# Patient Record
Sex: FEMALE | Race: WHITE | NOT HISPANIC OR LATINO | Employment: FULL TIME | URBAN - METROPOLITAN AREA
[De-identification: names, ages, dates, MRNs, and addresses within clinical notes are randomized per-mention and may not be internally consistent; named-entity substitution may affect disease eponyms.]

---

## 2017-05-31 ENCOUNTER — OFFICE VISIT (OUTPATIENT)
Dept: UROLOGY | Facility: CLINIC | Age: 27
End: 2017-05-31
Payer: COMMERCIAL

## 2017-05-31 VITALS
BODY MASS INDEX: 21.97 KG/M2 | WEIGHT: 140 LBS | DIASTOLIC BLOOD PRESSURE: 94 MMHG | SYSTOLIC BLOOD PRESSURE: 130 MMHG | HEIGHT: 67 IN | HEART RATE: 79 BPM

## 2017-05-31 DIAGNOSIS — N30.01 ACUTE CYSTITIS WITH HEMATURIA: Primary | ICD-10-CM

## 2017-05-31 PROCEDURE — 99203 OFFICE O/P NEW LOW 30 MIN: CPT | Mod: S$GLB,,, | Performed by: UROLOGY

## 2017-05-31 PROCEDURE — 87086 URINE CULTURE/COLONY COUNT: CPT

## 2017-05-31 PROCEDURE — 99999 PR PBB SHADOW E&M-NEW PATIENT-LVL IV: CPT | Mod: PBBFAC,,, | Performed by: UROLOGY

## 2017-05-31 NOTE — PROGRESS NOTES
Subjective:       Patient ID: Louann Florian is a 27 y.o. female.    Chief Complaint: Urinary Tract Infection (she states she has been battling a uti since sept 2016. she works in [place] and was treated with antibiotics without cultures. she had a culture when she came home and was treated and went back to work. she had a 2nd culture on return and was infected again and treated with 14 days of antibiotics that was 3 weeks ago  urine dips clear today)    HPI  patient is here for recurrent UTIs over the past 10 months.  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  She's had testing but no cultures while on the boat and was treated.  She's also had positive cultures done back in her home.  No fever chills nausea vomiting.  She has fullness on the right flank area flank pain.  She's had a couple episodes of hematuria..  It does not seem that these episodes of  cystitis.  Today her urine is clear she states she is not pregnant.  History reviewed. No pertinent past medical history.    History reviewed. No pertinent surgical history.    History reviewed. No pertinent family history.    Social History     Social History    Marital status: Single     Spouse name: N/A    Number of children: N/A    Years of education: N/A     Occupational History    Not on file.     Social History Main Topics    Smoking status: Never Smoker    Smokeless tobacco: Not on file    Alcohol use Not on file    Drug use: Unknown    Sexual activity: Not on file     Other Topics Concern    Not on file     Social History Narrative    No narrative on file       Allergies:  Review of patient's allergies indicates no known allergies.    Medications:  No current outpatient prescriptions on file.    Review of Systems   Constitutional: Negative.    HENT: Negative.    Eyes: Negative.    Respiratory: Negative.    Endocrine: Negative.    Genitourinary: Negative.    Musculoskeletal: Negative.    Allergic/Immunologic:  Negative.    Neurological: Negative.    Hematological: Negative.    Psychiatric/Behavioral: Negative.        Objective:      Physical Exam   Constitutional: She appears well-developed.   HENT:   Head: Normocephalic.   Cardiovascular: Normal rate.    Pulmonary/Chest: Effort normal.   Abdominal: Soft.   Musculoskeletal: Normal range of motion.   Neurological: She is alert.   Skin: Skin is warm.         Assessment:       1. Acute cystitis with hematuria        Plan:       Louann was seen today for urinary tract infection.    Diagnoses and all orders for this visit:    Acute cystitis with hematuria  -     Urine culture  -     US Retroperitoneal Complete (Kidney and; Future  -     X-Ray Abdomen AP 1 View; Future  -     Cystoscopy; Future        e'll consider low-dose Macrodantin but I will await the results for culture Her urine is clear today but she states that she's had positive cultures with negative urinalyses in the past.  I've also recommended probiotics yogurt cranberry juice and drinking lots of waterand she states that cranberry juice has helped her in the past

## 2017-06-01 ENCOUNTER — TELEPHONE (OUTPATIENT)
Dept: UROLOGY | Facility: CLINIC | Age: 27
End: 2017-06-01

## 2017-06-01 LAB
BACTERIA UR CULT: NORMAL
BACTERIA UR CULT: NORMAL

## 2017-06-01 NOTE — TELEPHONE ENCOUNTER
----- Message from Reilly Martinez Jr., MD sent at 6/1/2017  4:12 PM CDT -----  Neg culture  Does she want to start low dose macrodantin?

## 2017-06-02 RX ORDER — NITROFURANTOIN MACROCRYSTALS 50 MG/1
50 CAPSULE ORAL EVERY 6 HOURS
Qty: 30 CAPSULE | Refills: 6 | Status: SHIPPED | OUTPATIENT
Start: 2017-06-02 | End: 2022-02-16

## 2017-06-13 ENCOUNTER — TELEPHONE (OUTPATIENT)
Dept: UROLOGY | Facility: CLINIC | Age: 27
End: 2017-06-13

## 2017-06-13 ENCOUNTER — HOSPITAL ENCOUNTER (OUTPATIENT)
Dept: RADIOLOGY | Facility: HOSPITAL | Age: 27
Discharge: HOME | End: 2017-06-13
Attending: UROLOGY
Payer: COMMERCIAL

## 2017-06-13 DIAGNOSIS — N30.01 ACUTE CYSTITIS WITH HEMATURIA: ICD-10-CM

## 2017-06-13 PROCEDURE — 76770 US EXAM ABDO BACK WALL COMP: CPT | Mod: TC

## 2017-06-13 PROCEDURE — 76770 US EXAM ABDO BACK WALL COMP: CPT | Mod: 26,,, | Performed by: RADIOLOGY

## 2017-06-13 NOTE — TELEPHONE ENCOUNTER
----- Message from Reilly Martinez Jr., MD sent at 6/13/2017  5:02 PM CDT -----  5 mm stone r kidney  Needs kub

## 2017-06-15 ENCOUNTER — TELEPHONE (OUTPATIENT)
Dept: UROLOGY | Facility: CLINIC | Age: 27
End: 2017-06-15

## 2017-06-15 ENCOUNTER — HOSPITAL ENCOUNTER (OUTPATIENT)
Dept: RADIOLOGY | Facility: HOSPITAL | Age: 27
Discharge: HOME | End: 2017-06-15
Attending: UROLOGY
Payer: COMMERCIAL

## 2017-06-15 DIAGNOSIS — N30.01 ACUTE CYSTITIS WITH HEMATURIA: ICD-10-CM

## 2017-06-15 DIAGNOSIS — N20.0 KIDNEY STONES: Primary | ICD-10-CM

## 2017-06-15 PROCEDURE — 74000 XR ABDOMEN AP 1 VIEW: CPT | Mod: 26,,, | Performed by: RADIOLOGY

## 2017-06-15 PROCEDURE — 74000 XR ABDOMEN AP 1 VIEW: CPT | Mod: TC

## 2017-06-15 NOTE — TELEPHONE ENCOUNTER
----- Message from Reilly Martinez Jr., MD sent at 6/15/2017 10:18 AM CDT -----  Us shows r renal stone  KUB no stone but overlying bowel gas  Can do ct if she wishes or repeat kub in 6 mos

## 2017-06-15 NOTE — TELEPHONE ENCOUNTER
----- Message from Cora Griffith MA sent at 6/15/2017  9:33 AM CDT -----  Contact: [phone]  Asking if her results from her xray are back, states she needs to know ASAP if anything needs to be done because she leaves for [facility] Tuesday.

## 2017-06-16 ENCOUNTER — HOSPITAL ENCOUNTER (OUTPATIENT)
Dept: RADIOLOGY | Facility: OTHER | Age: 27
Discharge: HOME | End: 2017-06-16
Attending: UROLOGY
Payer: COMMERCIAL

## 2017-06-16 DIAGNOSIS — N20.0 KIDNEY STONES: ICD-10-CM

## 2017-06-16 PROCEDURE — 74176 CT ABD & PELVIS W/O CONTRAST: CPT | Mod: 26,,, | Performed by: RADIOLOGY

## 2017-06-16 PROCEDURE — 74176 CT ABD & PELVIS W/O CONTRAST: CPT | Mod: TC

## 2017-06-19 DIAGNOSIS — N20.0 RENAL CALCULI: Primary | ICD-10-CM

## 2017-07-04 ENCOUNTER — PATIENT MESSAGE (OUTPATIENT)
Dept: UROLOGY | Facility: CLINIC | Age: 27
End: 2017-07-04

## 2017-10-30 ENCOUNTER — TELEPHONE (OUTPATIENT)
Dept: OBSTETRICS AND GYNECOLOGY | Facility: CLINIC | Age: 27
End: 2017-10-30

## 2017-10-30 ENCOUNTER — PATIENT MESSAGE (OUTPATIENT)
Dept: OBSTETRICS AND GYNECOLOGY | Facility: CLINIC | Age: 27
End: 2017-10-30

## 2017-10-30 DIAGNOSIS — N91.2 AMENORRHEA: Primary | ICD-10-CM

## 2017-10-30 NOTE — TELEPHONE ENCOUNTER
----- Message from Larisa Bright sent at 10/30/2017  8:06 AM CDT -----  Contact: self  Patient would like to schedule initial ob appointment. Patient actually works in [place] and is gone for a month at a time. Patient will be returning again on 11/10-12/1. Patient would like to schedule a dating u/s and initial ob appoinment at this time. Patient cannot receive international phone calls so patient would like to be contacted through [facility] if possible. Patient's LMP was on 10/1/17.

## 2017-11-14 ENCOUNTER — LAB VISIT (OUTPATIENT)
Dept: LAB | Facility: OTHER | Age: 27
End: 2017-11-14
Attending: OBSTETRICS & GYNECOLOGY
Payer: COMMERCIAL

## 2017-11-14 ENCOUNTER — OFFICE VISIT (OUTPATIENT)
Dept: OBSTETRICS AND GYNECOLOGY | Facility: CLINIC | Age: 27
End: 2017-11-14
Payer: COMMERCIAL

## 2017-11-14 VITALS
WEIGHT: 142.19 LBS | SYSTOLIC BLOOD PRESSURE: 116 MMHG | BODY MASS INDEX: 22.32 KG/M2 | HEIGHT: 67 IN | DIASTOLIC BLOOD PRESSURE: 84 MMHG

## 2017-11-14 DIAGNOSIS — O21.9 NAUSEA/VOMITING IN PREGNANCY: ICD-10-CM

## 2017-11-14 DIAGNOSIS — N91.2 AMENORRHEA: ICD-10-CM

## 2017-11-14 DIAGNOSIS — N91.2 AMENORRHEA: Primary | ICD-10-CM

## 2017-11-14 LAB
ABO + RH BLD: NORMAL
B-HCG UR QL: POSITIVE
BASOPHILS # BLD AUTO: 0.02 K/UL
BASOPHILS NFR BLD: 0.2 %
BLD GP AB SCN CELLS X3 SERPL QL: NORMAL
CTP QC/QA: YES
DIFFERENTIAL METHOD: NORMAL
EOSINOPHIL # BLD AUTO: 0.2 K/UL
EOSINOPHIL NFR BLD: 1.8 %
ERYTHROCYTE [DISTWIDTH] IN BLOOD BY AUTOMATED COUNT: 13.6 %
HCT VFR BLD AUTO: 39.5 %
HGB BLD-MCNC: 13.1 G/DL
LYMPHOCYTES # BLD AUTO: 3.3 K/UL
LYMPHOCYTES NFR BLD: 28.1 %
MCH RBC QN AUTO: 28.6 PG
MCHC RBC AUTO-ENTMCNC: 33.2 G/DL
MCV RBC AUTO: 86 FL
MONOCYTES # BLD AUTO: 1 K/UL
MONOCYTES NFR BLD: 8.1 %
NEUTROPHILS # BLD AUTO: 7.2 K/UL
NEUTROPHILS NFR BLD: 61.6 %
PLATELET # BLD AUTO: 240 K/UL
PMV BLD AUTO: 10.5 FL
RBC # BLD AUTO: 4.58 M/UL
WBC # BLD AUTO: 11.69 K/UL

## 2017-11-14 PROCEDURE — 99999 PR PBB SHADOW E&M-EST. PATIENT-LVL II: CPT | Mod: PBBFAC,,, | Performed by: OBSTETRICS & GYNECOLOGY

## 2017-11-14 PROCEDURE — 85025 COMPLETE CBC W/AUTO DIFF WBC: CPT

## 2017-11-14 PROCEDURE — 36415 COLL VENOUS BLD VENIPUNCTURE: CPT

## 2017-11-14 PROCEDURE — 86762 RUBELLA ANTIBODY: CPT

## 2017-11-14 PROCEDURE — 81025 URINE PREGNANCY TEST: CPT | Mod: S$GLB,,, | Performed by: OBSTETRICS & GYNECOLOGY

## 2017-11-14 PROCEDURE — 99214 OFFICE O/P EST MOD 30 MIN: CPT | Mod: 25,S$GLB,, | Performed by: OBSTETRICS & GYNECOLOGY

## 2017-11-14 PROCEDURE — 86703 HIV-1/HIV-2 1 RESULT ANTBDY: CPT

## 2017-11-14 PROCEDURE — 87340 HEPATITIS B SURFACE AG IA: CPT

## 2017-11-14 PROCEDURE — 86901 BLOOD TYPING SEROLOGIC RH(D): CPT

## 2017-11-14 PROCEDURE — 87086 URINE CULTURE/COLONY COUNT: CPT

## 2017-11-14 PROCEDURE — 86592 SYPHILIS TEST NON-TREP QUAL: CPT

## 2017-11-14 PROCEDURE — 86900 BLOOD TYPING SEROLOGIC ABO: CPT

## 2017-11-14 PROCEDURE — 87591 N.GONORRHOEAE DNA AMP PROB: CPT

## 2017-11-14 PROCEDURE — 81220 CFTR GENE COM VARIANTS: CPT

## 2017-11-14 RX ORDER — DOXYLAMINE SUCCINATE AND PYRIDOXINE HYDROCHLORIDE, DELAYED RELEASE TABLETS 10 MG/10 MG 10; 10 MG/1; MG/1
2 TABLET, DELAYED RELEASE ORAL NIGHTLY
Qty: 120 TABLET | Refills: 1 | Status: SHIPPED | OUTPATIENT
Start: 2017-11-14 | End: 2022-02-16

## 2017-11-14 NOTE — PROGRESS NOTES
Louann Florina is a 27 y.o. , presents today for amenorrhea.    Has not seen any other provider for this possible pregnancy.     HPI: Patient's last menstrual period was 10/01/2017. This was the exact date. Prior to LMP, menses were regular occuring every 28 days. She is not currently on any contraception. Denies Experiencing some mild nausea, no vomiting. Has noticed breast tenderness. Denies vaginal bleeding since LMP.    SOCIAL HISTORY: Denies emotional/mental/physical/sexual violence or abuse. Feels safe at home. Accompanied today by her  who is the father of the baby. Feeling excited about pregnancy. This is the first baby for both of them. 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     PAP HISTORY: Pap up to date 2016 no history of abnormal paps in the past.     Reports a history of non-obstructive nephrolithiasis. She had recurrent UTIs at the end of 2016. She was seen by Urology in May 2017, was having some right flank pain and hematuria. She is unsure if the kidney stones ever passed.    Review of patient's allergies indicates:  No Known Allergies  No past medical history on file.  No past surgical history on file.  No past surgical history on file.  OB History    Para Term  AB Living   1             SAB TAB Ectopic Multiple Live Births                  # Outcome Date GA Lbr Blue/2nd Weight Sex Delivery Anes PTL Lv   1 Current                 OB History      Para Term  AB Living    1              SAB TAB Ectopic Multiple Live Births                     Social History     Social History    Marital status: Single     Spouse name: N/A    Number of children: N/A    Years of education: N/A     Occupational History    Not on file.     Social History Main Topics    Smoking status: Never Smoker    Smokeless tobacco: Never Used    Alcohol use No    Drug use: No    Sexual activity: Yes     Partners: Male     Other Topics Concern    Not  on file     Social History Narrative    No narrative on file     Family History   Problem Relation Age of Onset    Breast cancer Neg Hx     Colon cancer Neg Hx     Diabetes Neg Hx     Hypertension Neg Hx     Eclampsia Neg Hx     Stroke Neg Hx     Miscarriages / Stillbirths Neg Hx     Ovarian cancer Neg Hx      labor Neg Hx      History   Sexual Activity    Sexual activity: Yes    Partners: Male       GENETIC SCREENING   Patient's age 35 years or older as of estimated date of delivery? no  Nural tube defect (meningomyelocele, spina bifida, or anencephaly)? no  Down syndrome? no  Martín-Sachs (Ashkenazi Hindu, Cajun, Hungarian [place])? no  Canavan disease (Ashkenazi Hindu)? no  Familial dysautonomia (Ashkenazi Hindu)? no  Sickle cell disease or trait ()? no  Hemophilia or other blood disorders? no  Cystic fibrosis? no  Muscular dystrophy? no  Eakly's chorea? no  Thalassemia (Italian, Greek, Mediterranean, or  background) MCV less than 80? no  Congenital heart defect? no  Mental retardation/autism? no   If Yes, was person tested for Fragile X? no  Other inherited genetic or chromosomal disorder? no  Maternal metabolic disorder (e.g. type 1 diabetes, PKU)? no  Patient or baby's father had a child with birth defects not listed above? no  Recurrent pregnancy loss or a stillbirth: no  Medications (including supplements, vitamins, herbs or OTC drugs)/illicit/recreational drugs/alcohol since last menstrual period? no   If yes, agent(s) and strength/dose: no  List any other genetic risks: no  Comments/counseling: no    INFECTION HISTORY  Live with someone with TB or exposed to TB: no  Patient or partner has history of genital herpes: no  Rash or viral illness since last menstrual period: no  Patient or partner has hepatitis B or C: no  History of STD, gonorrhea, chlamydia, HPV, HIV, syphilis (list all that apply): no  List other infections: no  Additional comments: no    Primary Doctor No  "    ROS:    Constitutional/Gen: Denies fevers, chills, malaise, or weight loss. Pos fatigue   Psych: Denies depression, anxiety  Eyes: Denies changes in vision or scotomata  Ears, nose, mouth, throat: Denies sinus tenderness, swelling, or dentition problems  CV/vasc: Denies heart palpitations or edema  Resp: Denies SOB or dyspnea  Breasts: Denies mass, nipple discharge, or trauma. Pos breast tenderness.  GI: Denies constipation, diarrhea, or vomiting. mild nausea.  : Denies vaginal discharge, dysuria or pelvic pain. Neg urinary frequency.  MS: Denies weakness, soreness, or changes in ROM    OBJECTIVE:  /84   Ht 5' 7" (1.702 m)   Wt 64.5 kg (142 lb 3.2 oz)   LMP 10/01/2017   BMI 22.27 kg/m²   Constitutional/Gen: NAD, appears stated age, well groomed  Neck: supple, no masses or enlargement  Head: normocephalic  Skin: warm and dry w/o rash  Lung: normal resp effort, CTAB  Heart: normal HR, RRR   Back: negative CVAT  Breasts: bilaterally--no masses, tenderness, skin changes, or nipple discharge noted  Abdomen: soft, nontender, no masses, and bowel sounds normal, no enlargement  External genitalia: no lesions or discharge, normal hair distribution  Urethral meatus: normal size and location, no lesions or prolapse  Vagina: normal appearance, no lesions, no discharge, no evidence cystocele or rectocele.  Cervix: normal appearance, no discharge, no lesions, negative CMT  Uterus: nontender, mobile, approx 6 week size, contour, and position.  Adnexa: no masses or tenderness  Anus/Perineum: normal appearance, with no lesions or discharge. Internal exam deferred.  Extremities: FROM, with no edema or tenderness.  Neurologic: A&O x 4, non-focal, cranial nerves 2-12 grossly intact  Psych: affect appropriate and without signs of mood, thought or memory difficulty appreciated    UPT pos in office    ASSESSMENT:  27 y.o. female  with amenorrhea  Likely at 6w2d via LMP; S=D  Body mass index is 22.27 kg/m².  There is no " problem list on file for this patient.    PLAN:  1. Amenorrhea  -- + UPT in office, Patient's last menstrual period was 10/01/2017. --> Estimated Date of Delivery: 7/8/18  -- Dating US done  -- Anatomical US 19 - 20 weeks  -- Routine serum and urine prenatal labs today    2. Physical exam today  -- Pap up to date 11/2016    3. BMI normal  -- Discussed IOM recommended weight gain of:   Normal Weight 18.5-24.9  25-35     4. Discussed nausea and vomiting in pregnancy  -- Education regarding lifestyle and dietary modifications  -- Reviewed use of B6/Unisom prn. Pt will notify us if no relief/worsening symptoms, will consider alternative therapies prn    5. Pregnancy education and couseling; handouts and booklet provided  -- Oriented to practice and anticipated prenatal course of care and how to contact us  -- Precautions/warning signs reviewed  -- Common complaints of pregnancy  -- Routine prenatal labs including HIV  -- Ultrasounds  -- Nutrition, prepregnant BMI, and recommended weight gain  -- Toxoplasmosis precautions (Cats/Raw Meat)  -- Sexual activity and exercise  -- Environmental/Work hazards  -- Travel  -- Tobacco (Ask, Advise, Assess, Assist, and Arrange), as well as alcohol and drug use  -- Use of any medications (Including supplements, Vitamins, Herbs, or OTC Drugs)  -- Domestic violence screen neg    6. Reviewed genetic testing options. Reviewed available first trimester and/or second  trimester screening options. Reviewed risk of false positive/negative results and recommendation of referral to [facility] in event of a positive result, for NIPT, US, and/or amniocentesis. Reviewed the possible estimated 1 in 300/500 risk of miscarriage with amniocentesis, even with a healthy fetus. Patient desires genetic screening. Will order NT after dating.     8. Frequent air travel  -- Recommended to walk every 1-2 hours during flights and compression stockings.  -- Discontinue air travel at 36 wga    9. Heartburn  -- Continue  Tums, pepcid OTC    10. History of non-obstructing kidney stones  -- On macrobid suppression.  -- History as above, continue to monitor.   -- Tylenol/narcotics discussed if symptoms recur.    RTC x 4 weeks, call or present sooner prn. Patient given information to sign up for [facility].    Updated Medication List:  Current Outpatient Prescriptions   Medication Sig Dispense Refill    PRENATAL 25/IRON/FOLATE 6/DHA (PRENA1 ORAL) Take by mouth.      nitrofurantoin (MACRODANTIN) 50 MG capsule Take 1 capsule (50 mg total) by mouth every 6 (six) hours. 30 capsule 6     No current facility-administered medications for this visit.      Tyesha Parham MD  11/14/2017

## 2017-11-15 LAB
HBV SURFACE AG SERPL QL IA: NEGATIVE
HIV 1+2 AB+HIV1 P24 AG SERPL QL IA: NEGATIVE
RPR SER QL: NORMAL

## 2017-11-16 LAB
BACTERIA UR CULT: NORMAL
BACTERIA UR CULT: NORMAL
C TRACH DNA SPEC QL NAA+PROBE: NOT DETECTED
N GONORRHOEA DNA SPEC QL NAA+PROBE: NOT DETECTED

## 2017-11-17 LAB — CFTR MUT ANL BLD/T: NORMAL

## 2017-11-22 LAB — MAYO MISCELLANEOUS RESULT (REF): NORMAL

## 2017-11-24 LAB
RUBV IGG SER-ACNC: NORMAL IU/ML
RUBV IGG SER-IMP: NORMAL

## 2017-11-28 ENCOUNTER — PROCEDURE VISIT (OUTPATIENT)
Dept: OBSTETRICS AND GYNECOLOGY | Facility: CLINIC | Age: 27
End: 2017-11-28
Payer: COMMERCIAL

## 2017-11-28 DIAGNOSIS — N91.2 AMENORRHEA: ICD-10-CM

## 2017-11-28 DIAGNOSIS — Z36.89 ESTABLISH GESTATIONAL AGE, ULTRASOUND: ICD-10-CM

## 2017-11-28 PROCEDURE — 76817 TRANSVAGINAL US OBSTETRIC: CPT | Mod: S$GLB,,, | Performed by: OBSTETRICS & GYNECOLOGY

## 2017-11-29 ENCOUNTER — PATIENT MESSAGE (OUTPATIENT)
Dept: OBSTETRICS AND GYNECOLOGY | Facility: CLINIC | Age: 27
End: 2017-11-29

## 2017-11-29 DIAGNOSIS — Z13.79 ENCOUNTER FOR GENETIC SCREENING FOR DOWN SYNDROME: Primary | ICD-10-CM

## 2017-12-01 ENCOUNTER — ROUTINE PRENATAL (OUTPATIENT)
Dept: OBSTETRICS AND GYNECOLOGY | Facility: CLINIC | Age: 27
End: 2017-12-01
Payer: COMMERCIAL

## 2017-12-01 VITALS — BODY MASS INDEX: 23 KG/M2 | DIASTOLIC BLOOD PRESSURE: 80 MMHG | SYSTOLIC BLOOD PRESSURE: 106 MMHG | WEIGHT: 146.81 LBS

## 2017-12-01 DIAGNOSIS — Z34.01 ENCOUNTER FOR SUPERVISION OF NORMAL FIRST PREGNANCY IN FIRST TRIMESTER: ICD-10-CM

## 2017-12-01 DIAGNOSIS — Z3A.08 8 WEEKS GESTATION OF PREGNANCY: Primary | ICD-10-CM

## 2017-12-01 PROCEDURE — 0500F INITIAL PRENATAL CARE VISIT: CPT | Mod: S$GLB,,, | Performed by: OBSTETRICS & GYNECOLOGY

## 2017-12-01 PROCEDURE — 99999 PR PBB SHADOW E&M-EST. PATIENT-LVL II: CPT | Mod: PBBFAC,,, | Performed by: OBSTETRICS & GYNECOLOGY

## 2017-12-01 NOTE — PROGRESS NOTES
Feeling crampy today, unsure if it is gas pain. Recommended hydration, tylenol. Patient with questions about continuing work (works in [place] for a month at a time). 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 Reassured patient that continuing work is safe for now. Discussed most common complication in first trimester is miscarriage and most life threatening complication would be hemorrhage.   Will be out of the country for 1st trimester screening, will do quad screen when she returns.   Declined flu shot.   F/U 4 weeks.

## 2017-12-08 ENCOUNTER — PATIENT MESSAGE (OUTPATIENT)
Dept: MATERNAL FETAL MEDICINE | Facility: CLINIC | Age: 27
End: 2017-12-08

## 2017-12-26 ENCOUNTER — PATIENT MESSAGE (OUTPATIENT)
Dept: MATERNAL FETAL MEDICINE | Facility: CLINIC | Age: 27
End: 2017-12-26

## 2018-01-08 ENCOUNTER — LAB VISIT (OUTPATIENT)
Dept: LAB | Facility: OTHER | Age: 28
End: 2018-01-08
Attending: OBSTETRICS & GYNECOLOGY
Payer: COMMERCIAL

## 2018-01-08 ENCOUNTER — ROUTINE PRENATAL (OUTPATIENT)
Dept: OBSTETRICS AND GYNECOLOGY | Facility: CLINIC | Age: 28
End: 2018-01-08
Payer: COMMERCIAL

## 2018-01-08 VITALS — BODY MASS INDEX: 23.2 KG/M2 | SYSTOLIC BLOOD PRESSURE: 120 MMHG | DIASTOLIC BLOOD PRESSURE: 78 MMHG | WEIGHT: 148.13 LBS

## 2018-01-08 DIAGNOSIS — Z13.79 ENCOUNTER FOR GENETIC SCREENING FOR DOWN SYNDROME: ICD-10-CM

## 2018-01-08 DIAGNOSIS — K59.00 CONSTIPATION DURING PREGNANCY IN SECOND TRIMESTER: ICD-10-CM

## 2018-01-08 DIAGNOSIS — O99.612 CONSTIPATION DURING PREGNANCY IN SECOND TRIMESTER: ICD-10-CM

## 2018-01-08 DIAGNOSIS — Z3A.14 14 WEEKS GESTATION OF PREGNANCY: Primary | ICD-10-CM

## 2018-01-08 PROCEDURE — 36415 COLL VENOUS BLD VENIPUNCTURE: CPT

## 2018-01-08 PROCEDURE — 0502F SUBSEQUENT PRENATAL CARE: CPT | Mod: S$GLB,,, | Performed by: OBSTETRICS & GYNECOLOGY

## 2018-01-08 PROCEDURE — 81511 FTL CGEN ABNOR FOUR ANAL: CPT

## 2018-01-08 PROCEDURE — 99999 PR PBB SHADOW E&M-EST. PATIENT-LVL II: CPT | Mod: PBBFAC,,, | Performed by: OBSTETRICS & GYNECOLOGY

## 2018-01-08 NOTE — PATIENT INSTRUCTIONS
Constipation:  Start daily fiber.  Take 1 tsp of fiber powder (psyllium or other sugar-free powder).  Mix in 8 oz of water.  Take x 3-5 days.  Then, increase fiber by 1 tsp every 3-5 days until stool is easy to pass.  Stop and continue at that dose.   Do not exceed 6 tsps/day.  May also use over the counter stool softener 1-2 x/day.  AVOID laxatives.

## 2018-01-08 NOTE — PROGRESS NOTES
Recently returned from [place]. Leaving again on 1/30/18 for another month, and then planning to stay home for the remainder of pregnancy. Having constipation/diarrhea. Recommend stool softener/fiber. Quad today. Anatomy scheduled (early at 17 weeks 2/2 travel). Discussed OB glucose screen. F/U 4 weeks.

## 2018-01-11 ENCOUNTER — TELEPHONE (OUTPATIENT)
Dept: OBSTETRICS AND GYNECOLOGY | Facility: CLINIC | Age: 28
End: 2018-01-11

## 2018-01-11 DIAGNOSIS — Z13.79 ENCOUNTER FOR GENETIC SCREENING FOR DOWN SYNDROME: Primary | ICD-10-CM

## 2018-01-11 LAB
# FETUSES US: ABNORMAL
2ND TRIMESTER 4 SCREEN PNL SERPL: ABNORMAL
2ND TRIMESTER 4 SCREEN SERPL-IMP: ABNORMAL
AFP MOM SERPL: ABNORMAL
AFP SERPL-MCNC: 20 NG/ML
AGE AT DELIVERY: 30
B-HCG MOM SERPL: ABNORMAL
B-HCG SERPL-ACNC: 36.1 IU/ML
FET TS 21 RISK FROM MAT AGE: ABNORMAL
GA (DAYS): 6 D
GA (WEEKS): 14 WK
GA METHOD: ABNORMAL
IDDM PATIENT QL: ABNORMAL
INHIBIN A MOM SERPL: ABNORMAL
INHIBIN A SERPL-MCNC: 143.9 PG/ML
SMOKING STATUS FTND: NO
TS 18 RISK FETUS: ABNORMAL
TS 21 RISK FETUS: ABNORMAL
U ESTRIOL MOM SERPL: ABNORMAL
U ESTRIOL SERPL-MCNC: 0.77 NG/ML

## 2018-01-29 ENCOUNTER — OFFICE VISIT (OUTPATIENT)
Dept: MATERNAL FETAL MEDICINE | Facility: CLINIC | Age: 28
End: 2018-01-29
Attending: OBSTETRICS & GYNECOLOGY
Payer: COMMERCIAL

## 2018-01-29 ENCOUNTER — ROUTINE PRENATAL (OUTPATIENT)
Dept: OBSTETRICS AND GYNECOLOGY | Facility: CLINIC | Age: 28
End: 2018-01-29
Payer: COMMERCIAL

## 2018-01-29 ENCOUNTER — LAB VISIT (OUTPATIENT)
Dept: LAB | Facility: OTHER | Age: 28
End: 2018-01-29
Attending: OBSTETRICS & GYNECOLOGY
Payer: COMMERCIAL

## 2018-01-29 VITALS — SYSTOLIC BLOOD PRESSURE: 120 MMHG | WEIGHT: 152 LBS | DIASTOLIC BLOOD PRESSURE: 78 MMHG | BODY MASS INDEX: 23.81 KG/M2

## 2018-01-29 DIAGNOSIS — Z36.3 ANTENATAL SCREENING FOR MALFORMATION USING ULTRASONICS: ICD-10-CM

## 2018-01-29 DIAGNOSIS — Z13.79 ENCOUNTER FOR GENETIC SCREENING FOR DOWN SYNDROME: ICD-10-CM

## 2018-01-29 DIAGNOSIS — Z3A.17 17 WEEKS GESTATION OF PREGNANCY: Primary | ICD-10-CM

## 2018-01-29 DIAGNOSIS — Z3A.17 17 WEEKS GESTATION OF PREGNANCY: ICD-10-CM

## 2018-01-29 DIAGNOSIS — Z36.89 ENCOUNTER FOR ULTRASOUND TO CHECK FETAL GROWTH: Primary | ICD-10-CM

## 2018-01-29 PROCEDURE — 76816 OB US FOLLOW-UP PER FETUS: CPT | Mod: S$GLB,,, | Performed by: OBSTETRICS & GYNECOLOGY

## 2018-01-29 PROCEDURE — 99499 UNLISTED E&M SERVICE: CPT | Mod: S$GLB,,, | Performed by: OBSTETRICS & GYNECOLOGY

## 2018-01-29 PROCEDURE — 0502F SUBSEQUENT PRENATAL CARE: CPT | Mod: S$GLB,,, | Performed by: OBSTETRICS & GYNECOLOGY

## 2018-01-29 PROCEDURE — 36415 COLL VENOUS BLD VENIPUNCTURE: CPT

## 2018-01-29 PROCEDURE — 99999 PR PBB SHADOW E&M-EST. PATIENT-LVL II: CPT | Mod: PBBFAC,,, | Performed by: OBSTETRICS & GYNECOLOGY

## 2018-01-29 PROCEDURE — 81511 FTL CGEN ABNOR FOUR ANAL: CPT

## 2018-01-29 NOTE — LETTER
January 30, 2018      Tyesha Parham MD  [address] [facility] [address]           [facility]  [address]  [facility] [address]  Phone: [phone]          Patient: Louann Florian   MR Number: [id number]   YOB: 1990   Date of Visit: 1/29/2018       Dear Dr. Tyesha Parham:    Thank you for referring Louann Florian to me for evaluation. Attached you will find relevant portions of my assessment and plan of care.    If you have questions, please do not hesitate to call me. I look forward to following Louann Florian along with you.    Sincerely,    Angie Jacques MD    Enclosure  CC:  No Recipients    If you would like to receive this communication electronically, please contact [email or web address] or [phone] to request more information on [employer] Link access.    For providers and/or their staff who would like to refer a patient to [facility], please contact us through our one-stop-shop provider referral line, [facility], at [phone].    If you feel you have received this communication in error or would no longer like to receive these types of communications, please e-mail [email or web address]

## 2018-01-29 NOTE — PROGRESS NOTES
Feeling well. Quad today. Anatomy U/S today, scheduled for follow up in 4-6 weeks. Leaving for work tomorrow for 4 weeks, plans to be last work trip during pregnancy. Letter printed for work - will need to be in the states after 28 weeks for frequent appt. F/U 5 weeks.

## 2018-01-31 LAB
# FETUSES US: NORMAL
2ND TRIMESTER 4 SCREEN PNL SERPL: NEGATIVE
2ND TRIMESTER 4 SCREEN SERPL-IMP: NORMAL
AFP MOM SERPL: 0.98
AFP SERPL-MCNC: 43 NG/ML
AGE AT DELIVERY: 30
B-HCG MOM SERPL: 0.91
B-HCG SERPL-ACNC: 22 IU/ML
FET TS 21 RISK FROM MAT AGE: NORMAL
GA (DAYS): 6 D
GA (WEEKS): 17 WK
GA METHOD: NORMAL
IDDM PATIENT QL: NORMAL
INHIBIN A MOM SERPL: 0.86
INHIBIN A SERPL-MCNC: 133.4 PG/ML
SMOKING STATUS FTND: NO
TS 18 RISK FETUS: NORMAL
TS 21 RISK FETUS: NORMAL
U ESTRIOL MOM SERPL: 2.1
U ESTRIOL SERPL-MCNC: 2.56 NG/ML

## 2018-02-05 ENCOUNTER — PATIENT MESSAGE (OUTPATIENT)
Dept: MATERNAL FETAL MEDICINE | Facility: CLINIC | Age: 28
End: 2018-02-05

## 2018-03-08 ENCOUNTER — OFFICE VISIT (OUTPATIENT)
Dept: MATERNAL FETAL MEDICINE | Facility: CLINIC | Age: 28
End: 2018-03-08
Attending: OBSTETRICS & GYNECOLOGY
Payer: COMMERCIAL

## 2018-03-08 ENCOUNTER — ROUTINE PRENATAL (OUTPATIENT)
Dept: OBSTETRICS AND GYNECOLOGY | Facility: CLINIC | Age: 28
End: 2018-03-08
Payer: COMMERCIAL

## 2018-03-08 VITALS
SYSTOLIC BLOOD PRESSURE: 110 MMHG | DIASTOLIC BLOOD PRESSURE: 70 MMHG | WEIGHT: 159.38 LBS | BODY MASS INDEX: 24.96 KG/M2

## 2018-03-08 DIAGNOSIS — Z33.1 PREGNANCY, NORMAL, INCIDENTAL: ICD-10-CM

## 2018-03-08 DIAGNOSIS — Z34.92 SECOND TRIMESTER PREGNANCY: ICD-10-CM

## 2018-03-08 DIAGNOSIS — Z3A.22 22 WEEKS GESTATION OF PREGNANCY: Primary | ICD-10-CM

## 2018-03-08 DIAGNOSIS — Z36.89 ENCOUNTER FOR ULTRASOUND TO CHECK FETAL GROWTH: ICD-10-CM

## 2018-03-08 DIAGNOSIS — Z36.3 ENCOUNTER FOR ANTENATAL SCREENING FOR MALFORMATION USING ULTRASOUND: ICD-10-CM

## 2018-03-08 PROCEDURE — 0502F SUBSEQUENT PRENATAL CARE: CPT | Mod: S$GLB,,, | Performed by: OBSTETRICS & GYNECOLOGY

## 2018-03-08 PROCEDURE — 99499 UNLISTED E&M SERVICE: CPT | Mod: S$GLB,,, | Performed by: OBSTETRICS & GYNECOLOGY

## 2018-03-08 PROCEDURE — 99999 PR PBB SHADOW E&M-EST. PATIENT-LVL I: CPT | Mod: PBBFAC,,,

## 2018-03-08 PROCEDURE — 99999 PR PBB SHADOW E&M-EST. PATIENT-LVL II: CPT | Mod: PBBFAC,,, | Performed by: OBSTETRICS & GYNECOLOGY

## 2018-03-08 PROCEDURE — 76816 OB US FOLLOW-UP PER FETUS: CPT | Mod: S$GLB,,, | Performed by: OBSTETRICS & GYNECOLOGY

## 2018-03-08 NOTE — PROGRESS NOTES
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 Lungs CTAB. No CVA tenderness. Urine neg. Discussed heating pad, tylenol. Patient declined muscle relaxer. Discussed desire for unmedicated birth. Had F/U U/S F/U 4 weeks.

## 2018-03-08 NOTE — LETTER
March 8, 2018      Tyesha Parham MD  [address] [facility] [address]           [facility]  [address]  [facility] [address]  Phone: [phone]          Patient: Louann Florian   MR Number: [id number]   YOB: 1990   Date of Visit: 3/8/2018       Dear Dr. Tyesha Parham:    Thank you for referring Louann Florian to me for evaluation. Attached you will find relevant portions of my assessment and plan of care.    If you have questions, please do not hesitate to call me. I look forward to following Louann Florian along with you.    Sincerely,    Angie Jacques MD    Enclosure  CC:  No Recipients    If you would like to receive this communication electronically, please contact [email or web address] or [phone] to request more information on [employer] Link access.    For providers and/or their staff who would like to refer a patient to [facility], please contact us through our one-stop-shop provider referral line, [facility], at [phone].    If you feel you have received this communication in error or would no longer like to receive these types of communications, please e-mail [email or web address]

## 2018-03-09 ENCOUNTER — PATIENT MESSAGE (OUTPATIENT)
Dept: OBSTETRICS AND GYNECOLOGY | Facility: CLINIC | Age: 28
End: 2018-03-09

## 2018-03-18 ENCOUNTER — PATIENT MESSAGE (OUTPATIENT)
Dept: OBSTETRICS AND GYNECOLOGY | Facility: CLINIC | Age: 28
End: 2018-03-18

## 2018-03-19 RX ORDER — CYCLOBENZAPRINE HCL 5 MG
5 TABLET ORAL 3 TIMES DAILY PRN
Qty: 15 TABLET | Refills: 0 | Status: SHIPPED | OUTPATIENT
Start: 2018-03-19 | End: 2018-03-29

## 2018-04-05 ENCOUNTER — LAB VISIT (OUTPATIENT)
Dept: LAB | Facility: OTHER | Age: 28
End: 2018-04-05
Attending: OBSTETRICS & GYNECOLOGY
Payer: COMMERCIAL

## 2018-04-05 ENCOUNTER — ROUTINE PRENATAL (OUTPATIENT)
Dept: OBSTETRICS AND GYNECOLOGY | Facility: CLINIC | Age: 28
End: 2018-04-05
Payer: COMMERCIAL

## 2018-04-05 VITALS
WEIGHT: 167.56 LBS | BODY MASS INDEX: 26.24 KG/M2 | SYSTOLIC BLOOD PRESSURE: 128 MMHG | DIASTOLIC BLOOD PRESSURE: 86 MMHG

## 2018-04-05 DIAGNOSIS — Z3A.26 26 WEEKS GESTATION OF PREGNANCY: Primary | ICD-10-CM

## 2018-04-05 DIAGNOSIS — Z34.92 SECOND TRIMESTER PREGNANCY: ICD-10-CM

## 2018-04-05 LAB
BASOPHILS # BLD AUTO: 0.02 K/UL
BASOPHILS NFR BLD: 0.2 %
DIFFERENTIAL METHOD: ABNORMAL
EOSINOPHIL # BLD AUTO: 0.1 K/UL
EOSINOPHIL NFR BLD: 1.1 %
ERYTHROCYTE [DISTWIDTH] IN BLOOD BY AUTOMATED COUNT: 13.5 %
GLUCOSE SERPL-MCNC: 120 MG/DL
HCT VFR BLD AUTO: 34.2 %
HGB BLD-MCNC: 11.2 G/DL
LYMPHOCYTES # BLD AUTO: 2.2 K/UL
LYMPHOCYTES NFR BLD: 18.9 %
MCH RBC QN AUTO: 29.1 PG
MCHC RBC AUTO-ENTMCNC: 32.7 G/DL
MCV RBC AUTO: 89 FL
MONOCYTES # BLD AUTO: 0.7 K/UL
MONOCYTES NFR BLD: 6.4 %
NEUTROPHILS # BLD AUTO: 8.2 K/UL
NEUTROPHILS NFR BLD: 72.3 %
PLATELET # BLD AUTO: 226 K/UL
PMV BLD AUTO: 10.2 FL
RBC # BLD AUTO: 3.85 M/UL
WBC # BLD AUTO: 11.4 K/UL

## 2018-04-05 PROCEDURE — 0502F SUBSEQUENT PRENATAL CARE: CPT | Mod: S$GLB,,, | Performed by: OBSTETRICS & GYNECOLOGY

## 2018-04-05 PROCEDURE — 85025 COMPLETE CBC W/AUTO DIFF WBC: CPT

## 2018-04-05 PROCEDURE — 36415 COLL VENOUS BLD VENIPUNCTURE: CPT

## 2018-04-05 PROCEDURE — 99999 PR PBB SHADOW E&M-EST. PATIENT-LVL II: CPT | Mod: PBBFAC,,, | Performed by: OBSTETRICS & GYNECOLOGY

## 2018-04-05 PROCEDURE — 82950 GLUCOSE TEST: CPT

## 2018-04-05 NOTE — PROGRESS NOTES
Patient with questions about Tdap - will consider. Patient's maternal aunt diagnosed with hypertropic cardiomyopathy - recommended that patient's mother be tested. F/U 3 weeks.

## 2018-04-25 ENCOUNTER — TELEPHONE (OUTPATIENT)
Dept: OBSTETRICS AND GYNECOLOGY | Facility: CLINIC | Age: 28
End: 2018-04-25

## 2018-04-25 NOTE — TELEPHONE ENCOUNTER
----- Message from Ashley Oh sent at 4/25/2018 12:01 PM CDT -----  Contact: DYLLAN  Name of Who is Calling: DYLLAN      What is the request in detail: Patient is requesting a call back to reschedule her appointment she states she received a call to reschedule her appointment     Can the clinic reply by [facility]: No       What Number to Call Back if not in [facility]: [phone]

## 2018-04-26 ENCOUNTER — ROUTINE PRENATAL (OUTPATIENT)
Dept: OBSTETRICS AND GYNECOLOGY | Facility: CLINIC | Age: 28
End: 2018-04-26
Attending: OBSTETRICS & GYNECOLOGY
Payer: COMMERCIAL

## 2018-04-26 VITALS
BODY MASS INDEX: 26.52 KG/M2 | WEIGHT: 169.31 LBS | DIASTOLIC BLOOD PRESSURE: 86 MMHG | SYSTOLIC BLOOD PRESSURE: 114 MMHG

## 2018-04-26 DIAGNOSIS — Z34.01 ENCOUNTER FOR SUPERVISION OF NORMAL FIRST PREGNANCY IN FIRST TRIMESTER: Primary | ICD-10-CM

## 2018-04-26 PROCEDURE — 0502F SUBSEQUENT PRENATAL CARE: CPT | Mod: S$GLB,,, | Performed by: OBSTETRICS & GYNECOLOGY

## 2018-04-26 PROCEDURE — 99999 PR PBB SHADOW E&M-EST. PATIENT-LVL II: CPT | Mod: PBBFAC,,, | Performed by: OBSTETRICS & GYNECOLOGY

## 2018-04-26 NOTE — PROGRESS NOTES
Patient seen and examined.  No complaints, denies VB/LOF/Ctxns, reports good fetal movement. Precautions for Bleeding/ ROM/ Decreased fetal movement/ Pre-E reviewed.  Concerned about history of hypertrophic cardiomyopathy, concerned about if this is hereditary.    F/U scheduled 2 weeks

## 2018-05-16 ENCOUNTER — HOSPITAL ENCOUNTER (OUTPATIENT)
Dept: PERINATAL CARE | Facility: OTHER | Age: 28
Discharge: HOME | End: 2018-05-16
Attending: OBSTETRICS & GYNECOLOGY
Payer: COMMERCIAL

## 2018-05-16 ENCOUNTER — ROUTINE PRENATAL (OUTPATIENT)
Dept: OBSTETRICS AND GYNECOLOGY | Facility: CLINIC | Age: 28
End: 2018-05-16
Attending: OBSTETRICS & GYNECOLOGY
Payer: COMMERCIAL

## 2018-05-16 VITALS
BODY MASS INDEX: 27.21 KG/M2 | DIASTOLIC BLOOD PRESSURE: 83 MMHG | SYSTOLIC BLOOD PRESSURE: 120 MMHG | WEIGHT: 173.75 LBS

## 2018-05-16 DIAGNOSIS — O36.8390 FETAL TACHYCARDIA AFFECTING MANAGEMENT OF MOTHER: Primary | ICD-10-CM

## 2018-05-16 DIAGNOSIS — O36.8390 FETAL TACHYCARDIA AFFECTING MANAGEMENT OF MOTHER: ICD-10-CM

## 2018-05-16 PROCEDURE — 99999 PR PBB SHADOW E&M-EST. PATIENT-LVL III: CPT | Mod: PBBFAC,,, | Performed by: OBSTETRICS & GYNECOLOGY

## 2018-05-16 PROCEDURE — 0502F SUBSEQUENT PRENATAL CARE: CPT | Mod: S$GLB,,, | Performed by: OBSTETRICS & GYNECOLOGY

## 2018-05-16 PROCEDURE — 59025 FETAL NON-STRESS TEST: CPT

## 2018-05-16 NOTE — PROGRESS NOTES
Patient seen and examined.  No complaints, denies VB/LOF/Ctxns, reports good fetal movement. Precautions for Bleeding/ ROM/ Decreased fetal movement/ Pre-E reviewed.  Fetal tachycardia noted today, sent to PNT, reported to have normal FHT there per JUANA Galicia.  F/U scheduled 2 weeks

## 2018-05-30 ENCOUNTER — ROUTINE PRENATAL (OUTPATIENT)
Dept: OBSTETRICS AND GYNECOLOGY | Facility: CLINIC | Age: 28
End: 2018-05-30
Attending: OBSTETRICS & GYNECOLOGY
Payer: COMMERCIAL

## 2018-05-30 VITALS
DIASTOLIC BLOOD PRESSURE: 64 MMHG | SYSTOLIC BLOOD PRESSURE: 110 MMHG | WEIGHT: 176.38 LBS | BODY MASS INDEX: 27.62 KG/M2

## 2018-05-30 DIAGNOSIS — Z34.01 ENCOUNTER FOR SUPERVISION OF NORMAL FIRST PREGNANCY IN FIRST TRIMESTER: Primary | ICD-10-CM

## 2018-05-30 PROCEDURE — 0502F SUBSEQUENT PRENATAL CARE: CPT | Mod: S$GLB,,, | Performed by: OBSTETRICS & GYNECOLOGY

## 2018-05-30 PROCEDURE — 99999 PR PBB SHADOW E&M-EST. PATIENT-LVL III: CPT | Mod: PBBFAC,,, | Performed by: OBSTETRICS & GYNECOLOGY

## 2018-05-30 NOTE — PROGRESS NOTES
Patient seen and examined.  No complaints, denies VB/LOF/Ctxns, reports good fetal movement. Precautions for Bleeding/ ROM/ Decreased fetal movement/ Pre-E reviewed.  F/U scheduled 1 weeks

## 2018-06-01 ENCOUNTER — PATIENT MESSAGE (OUTPATIENT)
Dept: OBSTETRICS AND GYNECOLOGY | Facility: CLINIC | Age: 28
End: 2018-06-01

## 2018-06-05 ENCOUNTER — ROUTINE PRENATAL (OUTPATIENT)
Dept: OBSTETRICS AND GYNECOLOGY | Facility: CLINIC | Age: 28
End: 2018-06-05
Attending: OBSTETRICS & GYNECOLOGY
Payer: COMMERCIAL

## 2018-06-05 VITALS — BODY MASS INDEX: 28.02 KG/M2 | WEIGHT: 178.88 LBS

## 2018-06-05 DIAGNOSIS — Z34.01 ENCOUNTER FOR SUPERVISION OF NORMAL FIRST PREGNANCY IN FIRST TRIMESTER: ICD-10-CM

## 2018-06-05 PROCEDURE — 87081 CULTURE SCREEN ONLY: CPT

## 2018-06-05 PROCEDURE — 0502F SUBSEQUENT PRENATAL CARE: CPT | Mod: S$GLB,,, | Performed by: OBSTETRICS & GYNECOLOGY

## 2018-06-08 ENCOUNTER — TELEPHONE (OUTPATIENT)
Dept: OBSTETRICS AND GYNECOLOGY | Facility: CLINIC | Age: 28
End: 2018-06-08

## 2018-06-08 LAB — BACTERIA SPEC AEROBE CULT: NORMAL

## 2018-06-08 NOTE — TELEPHONE ENCOUNTER
----- Message from Isabel Silva sent at 6/8/2018 10:41 AM CDT -----            Name of Who is Calling: amelia      What is the request in detail: pt. Returning phone call. Please call to discuss      Can the clinic reply by [facility]: no      What Number to Call Back if not in [facility]: [phone]

## 2018-06-14 ENCOUNTER — LAB VISIT (OUTPATIENT)
Dept: LAB | Facility: OTHER | Age: 28
End: 2018-06-14
Attending: OBSTETRICS & GYNECOLOGY
Payer: COMMERCIAL

## 2018-06-14 ENCOUNTER — ROUTINE PRENATAL (OUTPATIENT)
Dept: OBSTETRICS AND GYNECOLOGY | Facility: CLINIC | Age: 28
End: 2018-06-14
Attending: OBSTETRICS & GYNECOLOGY
Payer: COMMERCIAL

## 2018-06-14 VITALS
BODY MASS INDEX: 28.24 KG/M2 | DIASTOLIC BLOOD PRESSURE: 82 MMHG | SYSTOLIC BLOOD PRESSURE: 118 MMHG | WEIGHT: 180.31 LBS

## 2018-06-14 DIAGNOSIS — Z34.01 ENCOUNTER FOR SUPERVISION OF NORMAL FIRST PREGNANCY IN FIRST TRIMESTER: Primary | ICD-10-CM

## 2018-06-14 DIAGNOSIS — Z34.01 ENCOUNTER FOR SUPERVISION OF NORMAL FIRST PREGNANCY IN FIRST TRIMESTER: ICD-10-CM

## 2018-06-14 LAB
BASOPHILS # BLD AUTO: ABNORMAL K/UL
BASOPHILS NFR BLD: 0 %
DIFFERENTIAL METHOD: ABNORMAL
EOSINOPHIL # BLD AUTO: ABNORMAL K/UL
EOSINOPHIL NFR BLD: 0 %
ERYTHROCYTE [DISTWIDTH] IN BLOOD BY AUTOMATED COUNT: 13.2 %
GIANT PLATELETS BLD QL SMEAR: PRESENT
HCT VFR BLD AUTO: 38.7 %
HGB BLD-MCNC: 12.9 G/DL
LYMPHOCYTES # BLD AUTO: ABNORMAL K/UL
LYMPHOCYTES NFR BLD: 16 %
MCH RBC QN AUTO: 29.1 PG
MCHC RBC AUTO-ENTMCNC: 33.3 G/DL
MCV RBC AUTO: 87 FL
MONOCYTES # BLD AUTO: ABNORMAL K/UL
MONOCYTES NFR BLD: 6 %
NEUTROPHILS NFR BLD: 75 %
NEUTS BAND NFR BLD MANUAL: 3 %
PLATELET # BLD AUTO: 254 K/UL
PLATELET BLD QL SMEAR: ABNORMAL
PMV BLD AUTO: 10.4 FL
RBC # BLD AUTO: 4.44 M/UL
WBC # BLD AUTO: 12.66 K/UL

## 2018-06-14 PROCEDURE — 85027 COMPLETE CBC AUTOMATED: CPT

## 2018-06-14 PROCEDURE — 36415 COLL VENOUS BLD VENIPUNCTURE: CPT

## 2018-06-14 PROCEDURE — 0502F SUBSEQUENT PRENATAL CARE: CPT | Mod: S$GLB,,, | Performed by: OBSTETRICS & GYNECOLOGY

## 2018-06-14 PROCEDURE — 99999 PR PBB SHADOW E&M-EST. PATIENT-LVL II: CPT | Mod: PBBFAC,,, | Performed by: OBSTETRICS & GYNECOLOGY

## 2018-06-14 PROCEDURE — 85007 BL SMEAR W/DIFF WBC COUNT: CPT

## 2018-06-14 PROCEDURE — 86592 SYPHILIS TEST NON-TREP QUAL: CPT

## 2018-06-14 PROCEDURE — 86703 HIV-1/HIV-2 1 RESULT ANTBDY: CPT

## 2018-06-15 LAB
HIV 1+2 AB+HIV1 P24 AG SERPL QL IA: NEGATIVE
RPR SER QL: NORMAL

## 2018-06-20 ENCOUNTER — ROUTINE PRENATAL (OUTPATIENT)
Dept: OBSTETRICS AND GYNECOLOGY | Facility: CLINIC | Age: 28
End: 2018-06-20
Attending: OBSTETRICS & GYNECOLOGY
Payer: COMMERCIAL

## 2018-06-20 VITALS
SYSTOLIC BLOOD PRESSURE: 118 MMHG | DIASTOLIC BLOOD PRESSURE: 73 MMHG | WEIGHT: 178.38 LBS | BODY MASS INDEX: 27.93 KG/M2

## 2018-06-20 DIAGNOSIS — Z34.01 ENCOUNTER FOR SUPERVISION OF NORMAL FIRST PREGNANCY IN FIRST TRIMESTER: Primary | ICD-10-CM

## 2018-06-20 PROCEDURE — 0502F SUBSEQUENT PRENATAL CARE: CPT | Mod: S$GLB,,, | Performed by: OBSTETRICS & GYNECOLOGY

## 2018-06-20 PROCEDURE — 99999 PR PBB SHADOW E&M-EST. PATIENT-LVL II: CPT | Mod: PBBFAC,,, | Performed by: OBSTETRICS & GYNECOLOGY

## 2018-06-20 NOTE — PROGRESS NOTES
Patient seen and examined.  No complaints, denies VB/LOF/Ctxns, reports good fetal movement. Precautions for Bleeding/ ROM/ Decreased fetal movement/ Pre-E reviewed.  Cervix favorable, discussed hospital stay and vaccines.  F/U scheduled 1 weeks

## 2018-06-27 ENCOUNTER — ROUTINE PRENATAL (OUTPATIENT)
Dept: OBSTETRICS AND GYNECOLOGY | Facility: CLINIC | Age: 28
End: 2018-06-27
Attending: OBSTETRICS & GYNECOLOGY
Payer: COMMERCIAL

## 2018-06-27 VITALS
SYSTOLIC BLOOD PRESSURE: 120 MMHG | WEIGHT: 178.56 LBS | BODY MASS INDEX: 27.97 KG/M2 | DIASTOLIC BLOOD PRESSURE: 80 MMHG

## 2018-06-27 DIAGNOSIS — Z34.01 ENCOUNTER FOR SUPERVISION OF NORMAL FIRST PREGNANCY IN FIRST TRIMESTER: Primary | ICD-10-CM

## 2018-06-27 PROCEDURE — 0502F SUBSEQUENT PRENATAL CARE: CPT | Mod: S$GLB,,, | Performed by: OBSTETRICS & GYNECOLOGY

## 2018-06-27 PROCEDURE — 99999 PR PBB SHADOW E&M-EST. PATIENT-LVL II: CPT | Mod: PBBFAC,,, | Performed by: OBSTETRICS & GYNECOLOGY

## 2018-07-01 ENCOUNTER — HOSPITAL ENCOUNTER (INPATIENT)
Facility: OTHER | Age: 28
LOS: 2 days | Discharge: HOME | End: 2018-07-03
Attending: OBSTETRICS & GYNECOLOGY | Admitting: OBSTETRICS & GYNECOLOGY
Payer: COMMERCIAL

## 2018-07-01 ENCOUNTER — ANESTHESIA (OUTPATIENT)
Dept: OBSTETRICS AND GYNECOLOGY | Facility: OTHER | Age: 28
End: 2018-07-01

## 2018-07-01 ENCOUNTER — ANESTHESIA EVENT (OUTPATIENT)
Dept: OBSTETRICS AND GYNECOLOGY | Facility: OTHER | Age: 28
End: 2018-07-01

## 2018-07-01 DIAGNOSIS — Z3A.39 39 WEEKS GESTATION OF PREGNANCY: ICD-10-CM

## 2018-07-01 DIAGNOSIS — O47.9 UTERINE CONTRACTIONS DURING PREGNANCY: Primary | ICD-10-CM

## 2018-07-01 LAB
ABO + RH BLD: NORMAL
BASOPHILS # BLD AUTO: 0.01 K/UL
BASOPHILS NFR BLD: 0.1 %
BLD GP AB SCN CELLS X3 SERPL QL: NORMAL
DIFFERENTIAL METHOD: ABNORMAL
EOSINOPHIL # BLD AUTO: 0.1 K/UL
EOSINOPHIL NFR BLD: 0.6 %
ERYTHROCYTE [DISTWIDTH] IN BLOOD BY AUTOMATED COUNT: 13.3 %
HCT VFR BLD AUTO: 36.6 %
HGB BLD-MCNC: 12.3 G/DL
LYMPHOCYTES # BLD AUTO: 2.3 K/UL
LYMPHOCYTES NFR BLD: 18.5 %
MCH RBC QN AUTO: 28.8 PG
MCHC RBC AUTO-ENTMCNC: 33.6 G/DL
MCV RBC AUTO: 86 FL
MONOCYTES # BLD AUTO: 0.7 K/UL
MONOCYTES NFR BLD: 6 %
NEUTROPHILS # BLD AUTO: 9 K/UL
NEUTROPHILS NFR BLD: 73.5 %
PLATELET # BLD AUTO: 243 K/UL
PMV BLD AUTO: 10.6 FL
RBC # BLD AUTO: 4.27 M/UL
WBC # BLD AUTO: 12.25 K/UL

## 2018-07-01 PROCEDURE — 72100002 HC LABOR CARE, 1ST 8 HOURS

## 2018-07-01 PROCEDURE — 36415 COLL VENOUS BLD VENIPUNCTURE: CPT

## 2018-07-01 PROCEDURE — 0HQ9XZZ REPAIR PERINEUM SKIN, EXTERNAL APPROACH: ICD-10-PCS | Performed by: OBSTETRICS & GYNECOLOGY

## 2018-07-01 PROCEDURE — 99283 EMERGENCY DEPT VISIT LOW MDM: CPT | Mod: 25,,, | Performed by: OBSTETRICS & GYNECOLOGY

## 2018-07-01 PROCEDURE — 59025 FETAL NON-STRESS TEST: CPT

## 2018-07-01 PROCEDURE — 4A0HXCZ MEASUREMENT OF PRODUCTS OF CONCEPTION, CARDIAC RATE, EXTERNAL APPROACH: ICD-10-PCS | Performed by: OBSTETRICS & GYNECOLOGY

## 2018-07-01 PROCEDURE — 86850 RBC ANTIBODY SCREEN: CPT

## 2018-07-01 PROCEDURE — 72200004 HC VAGINAL DELIVERY LEVEL I

## 2018-07-01 PROCEDURE — 11000001 HC ACUTE MED/SURG PRIVATE ROOM

## 2018-07-01 PROCEDURE — 59025 FETAL NON-STRESS TEST: CPT | Mod: 26,,, | Performed by: OBSTETRICS & GYNECOLOGY

## 2018-07-01 PROCEDURE — 85025 COMPLETE CBC W/AUTO DIFF WBC: CPT

## 2018-07-01 PROCEDURE — 99285 EMERGENCY DEPT VISIT HI MDM: CPT | Mod: 25

## 2018-07-01 PROCEDURE — 25000003 PHARM REV CODE 250: Performed by: STUDENT IN AN ORGANIZED HEALTH CARE EDUCATION/TRAINING PROGRAM

## 2018-07-01 RX ORDER — DIPHENHYDRAMINE HCL 25 MG
25 CAPSULE ORAL EVERY 4 HOURS PRN
Status: DISCONTINUED | OUTPATIENT
Start: 2018-07-01 | End: 2018-07-03 | Stop reason: HOSPADM

## 2018-07-01 RX ORDER — HYDROCODONE BITARTRATE AND ACETAMINOPHEN 7.5; 325 MG/1; MG/1
1 TABLET ORAL EVERY 4 HOURS PRN
Status: DISCONTINUED | OUTPATIENT
Start: 2018-07-01 | End: 2018-07-03 | Stop reason: HOSPADM

## 2018-07-01 RX ORDER — METHYLERGONOVINE MALEATE 0.2 MG/ML
200 INJECTION INTRAVENOUS
Status: DISCONTINUED | OUTPATIENT
Start: 2018-07-01 | End: 2018-07-03 | Stop reason: HOSPADM

## 2018-07-01 RX ORDER — CARBOPROST TROMETHAMINE 250 UG/ML
250 INJECTION, SOLUTION INTRAMUSCULAR
Status: DISCONTINUED | OUTPATIENT
Start: 2018-07-01 | End: 2018-07-03 | Stop reason: HOSPADM

## 2018-07-01 RX ORDER — NITROFURANTOIN 25; 75 MG/1; MG/1
100 CAPSULE ORAL 2 TIMES DAILY
Status: DISCONTINUED | OUTPATIENT
Start: 2018-07-01 | End: 2018-07-03 | Stop reason: HOSPADM

## 2018-07-01 RX ORDER — ONDANSETRON 8 MG/1
8 TABLET, ORALLY DISINTEGRATING ORAL EVERY 8 HOURS PRN
Status: DISCONTINUED | OUTPATIENT
Start: 2018-07-01 | End: 2018-07-01

## 2018-07-01 RX ORDER — DOCUSATE SODIUM 100 MG/1
200 CAPSULE, LIQUID FILLED ORAL 2 TIMES DAILY PRN
Status: DISCONTINUED | OUTPATIENT
Start: 2018-07-01 | End: 2018-07-03 | Stop reason: HOSPADM

## 2018-07-01 RX ORDER — HYDROCODONE BITARTRATE AND ACETAMINOPHEN 10; 325 MG/1; MG/1
1 TABLET ORAL EVERY 4 HOURS PRN
Status: DISCONTINUED | OUTPATIENT
Start: 2018-07-01 | End: 2018-07-03 | Stop reason: HOSPADM

## 2018-07-01 RX ORDER — ONDANSETRON 8 MG/1
8 TABLET, ORALLY DISINTEGRATING ORAL EVERY 8 HOURS PRN
Status: DISCONTINUED | OUTPATIENT
Start: 2018-07-01 | End: 2018-07-03 | Stop reason: HOSPADM

## 2018-07-01 RX ORDER — LIDOCAINE HYDROCHLORIDE 10 MG/ML
1 INJECTION INFILTRATION; PERINEURAL DAILY PRN
Status: DISCONTINUED | OUTPATIENT
Start: 2018-07-01 | End: 2018-07-01

## 2018-07-01 RX ORDER — NITROFURANTOIN MACROCRYSTALS 50 MG/1
50 CAPSULE ORAL EVERY 6 HOURS
Status: DISCONTINUED | OUTPATIENT
Start: 2018-07-01 | End: 2018-07-01

## 2018-07-01 RX ORDER — OXYTOCIN/RINGER'S LACTATE 20/1000 ML
PLASTIC BAG, INJECTION (ML) INTRAVENOUS
Status: DISPENSED
Start: 2018-07-01 | End: 2018-07-02

## 2018-07-01 RX ORDER — SODIUM CHLORIDE 9 MG/ML
INJECTION, SOLUTION INTRAVENOUS
Status: DISCONTINUED | OUTPATIENT
Start: 2018-07-01 | End: 2018-07-01

## 2018-07-01 RX ORDER — IBUPROFEN 600 MG/1
600 TABLET ORAL EVERY 6 HOURS PRN
Status: DISCONTINUED | OUTPATIENT
Start: 2018-07-01 | End: 2018-07-03 | Stop reason: HOSPADM

## 2018-07-01 RX ORDER — OXYTOCIN/RINGER'S LACTATE 20/1000 ML
41.65 PLASTIC BAG, INJECTION (ML) INTRAVENOUS CONTINUOUS
Status: ACTIVE | OUTPATIENT
Start: 2018-07-01 | End: 2018-07-01

## 2018-07-01 RX ORDER — DIPHENHYDRAMINE HYDROCHLORIDE 50 MG/ML
25 INJECTION INTRAMUSCULAR; INTRAVENOUS EVERY 4 HOURS PRN
Status: DISCONTINUED | OUTPATIENT
Start: 2018-07-01 | End: 2018-07-03 | Stop reason: HOSPADM

## 2018-07-01 RX ORDER — ACETAMINOPHEN 325 MG/1
650 TABLET ORAL EVERY 6 HOURS PRN
Status: DISCONTINUED | OUTPATIENT
Start: 2018-07-01 | End: 2018-07-03 | Stop reason: HOSPADM

## 2018-07-01 RX ORDER — IBUPROFEN 600 MG/1
600 TABLET ORAL EVERY 6 HOURS PRN
Qty: 30 TABLET | Refills: 2 | Status: SHIPPED | OUTPATIENT
Start: 2018-07-01 | End: 2021-12-04

## 2018-07-01 RX ORDER — MISOPROSTOL 200 UG/1
600 TABLET ORAL
Status: DISCONTINUED | OUTPATIENT
Start: 2018-07-01 | End: 2018-07-03 | Stop reason: HOSPADM

## 2018-07-01 RX ORDER — HYDROCORTISONE 25 MG/G
CREAM TOPICAL 3 TIMES DAILY PRN
Status: DISCONTINUED | OUTPATIENT
Start: 2018-07-01 | End: 2018-07-03 | Stop reason: HOSPADM

## 2018-07-01 RX ORDER — OXYTOCIN/RINGER'S LACTATE 20/1000 ML
41.65 PLASTIC BAG, INJECTION (ML) INTRAVENOUS CONTINUOUS
Status: DISCONTINUED | OUTPATIENT
Start: 2018-07-01 | End: 2018-07-01

## 2018-07-01 RX ORDER — SODIUM CHLORIDE, SODIUM LACTATE, POTASSIUM CHLORIDE, CALCIUM CHLORIDE 600; 310; 30; 20 MG/100ML; MG/100ML; MG/100ML; MG/100ML
INJECTION, SOLUTION INTRAVENOUS CONTINUOUS
Status: DISCONTINUED | OUTPATIENT
Start: 2018-07-01 | End: 2018-07-01

## 2018-07-01 RX ADMIN — NITROFURANTOIN (MONOHYDRATE/MACROCRYSTALS) 100 MG: 75; 25 CAPSULE ORAL at 08:07

## 2018-07-01 RX ADMIN — DOCUSATE SODIUM 200 MG: 100 CAPSULE, LIQUID FILLED ORAL at 09:07

## 2018-07-01 NOTE — ANESTHESIA PREPROCEDURE EVALUATION
2018  Louann Kapoor is a 28 y.o. female without significant PMH who presents in active labor.     OB History    Para Term  AB Living   1             SAB TAB Ectopic Multiple Live Births                  # Outcome Date GA Lbr Blue/2nd Weight Sex Delivery Anes PTL Lv   1 Current                   Wt Readings from Last 1 Encounters:   18 0909 79.4 kg (175 lb)       BP Readings from Last 3 Encounters:   18 124/86   18 120/80   18 118/73       Patient Active Problem List   Diagnosis    G1;declines flu;    Uterine contractions during pregnancy       No past surgical history on file.    Social History     Social History    Marital status: Single     Spouse name: N/A    Number of children: N/A    Years of education: N/A     Occupational History    Not on file.     Social History Main Topics    Smoking status: Never Smoker    Smokeless tobacco: Never Used    Alcohol use No    Drug use: No    Sexual activity: Yes     Partners: Male     Other Topics Concern    Not on file     Social History Narrative    No narrative on file         Chemistry    No results found for: NA, K, CL, CO2, BUN, CREATININE, GLU No results found for: CALCIUM, ALKPHOS, AST, ALT, BILITOT, ESTGFRAFRICA, EGFRNONAA         Lab Results   Component Value Date    WBC 12.25 2018    HGB 12.3 2018    HCT 36.6 (L) 2018    MCV 86 2018     2018       No results for input(s): PT, INR, PROTIME, APTT in the last 72 hours.                  Anesthesia Evaluation    I have reviewed the Patient Summary Reports.     I have reviewed the Medications.     Review of Systems  Anesthesia Hx:  No previous Anesthesia  Neg history of prior surgery.   Social:  Non-Smoker    Cardiovascular:  Cardiovascular Normal     Pulmonary:  Pulmonary Normal    Hepatic/GI:  Hepatic/GI Normal     Neurological:  Neurology Normal    Endocrine:  Endocrine Normal        Physical Exam  General:  Well nourished    Airway/Jaw/Neck:  Airway Findings: Mouth Opening: Normal Tongue: Normal  General Airway Assessment: Adult  Mallampati: III  Improves to II with phonation.  TM Distance: Normal, at least 6 cm  Jaw/Neck Findings:  Limited Ability to Prognath  Neck ROM: Normal ROM      Dental:  Dental Findings: In tact   Chest/Lungs:  Chest/Lungs Findings: Clear to auscultation, Normal Respiratory Rate     Heart/Vascular:  Heart Findings: Rate: Normal  Rhythm: Regular Rhythm  Sounds: Normal             Anesthesia Plan  Type of Anesthesia, risks & benefits discussed:  Anesthesia Type:  CSE, epidural, general, spinal  Patient's Preference:   Intra-op Monitoring Plan: standard ASA monitors  Intra-op Monitoring Plan Comments:   Post Op Pain Control Plan:   Post Op Pain Control Plan Comments:   Induction:   IV  Beta Blocker:  Patient is not currently on a Beta-Blocker (No further documentation required).       Informed Consent: Patient understands risks and agrees with Anesthesia plan.  Questions answered. Anesthesia consent signed with patient.  ASA Score: 2     Day of Surgery Review of History & Physical:    H&P update referred to the provider.         Ready For Surgery From Anesthesia Perspective.

## 2018-07-01 NOTE — H&P
HISTORY AND PHYSICAL                                                OBSTETRICS          Subjective:        Louann Kapoor is a 28 y.o. F at 39w0d presents complaining of leakage of clear fluid at 4 AM. Kaleigh every 2 mins.   This IUP is complicated by nothing.  Patient reports contractions, denies vaginal bleeding, reports LOF.   Fetal Movement: normal  PMHx: No past medical history on file.    PSHx: No past surgical history on file.    All: Review of patient's allergies indicates:  No Known Allergies    Meds:   Prescriptions Prior to Admission   Medication Sig Dispense Refill Last Dose    doxylamine-pyridoxine, vit B6, (DICLEGIS) 10-10 mg TbEC Take 2 tablets by mouth every evening. If no improvement in nausea, can take 1 pill in morning and 2 at bedtime. 120 tablet 1 Not Taking    nitrofurantoin (MACRODANTIN) 50 MG capsule Take 1 capsule (50 mg total) by mouth every 6 (six) hours. 30 capsule 6 Not Taking    PRENATAL 25/IRON/FOLATE 6/DHA (PRENA1 ORAL) Take by mouth.   Taking       SH:   Social History     Social History    Marital status: Single     Spouse name: N/A    Number of children: N/A    Years of education: N/A     Occupational History    Not on file.     Social History Main Topics    Smoking status: Never Smoker    Smokeless tobacco: Never Used    Alcohol use No    Drug use: No    Sexual activity: Yes     Partners: Male     Other Topics Concern    Not on file     Social History Narrative    No narrative on file       FH:   Family History   Problem Relation Age of Onset    Breast cancer Neg Hx     Colon cancer Neg Hx     Diabetes Neg Hx     Hypertension Neg Hx     Eclampsia Neg Hx     Stroke Neg Hx     Miscarriages / Stillbirths Neg Hx     Ovarian cancer Neg Hx      labor Neg Hx        OBHx:   Obstetric History       T0      L0     SAB0   TAB0   Ectopic0   Multiple0   Live Births0       # Outcome Date GA Lbr Blue/2nd Weight Sex Delivery Anes PTL Lv    1 Current                   Objective:       /71 (BP Location: Right arm, Patient Position: Lying)   Pulse 75   Temp 97.9 °F (36.6 °C) (Oral)   Resp 16   LMP 10/01/2017   SpO2 100%   Breastfeeding? No     Vitals:    07/01/18 0905   BP: 112/71   BP Location: Right arm   Patient Position: Lying   Pulse: 75   Resp: 16   Temp: 97.9 °F (36.6 °C)   TempSrc: Oral   SpO2: 100%       General:   alert, appears stated age and cooperative   Lungs:   clear to auscultation bilaterally   Heart:   regular rate and rhythm, S1, S2 normal, no murmur, click, rub or gallop   Abdomen:  soft, non-tender; bowel sounds normal; no masses,  no organomegaly   Extremities negative edema, negative erythema   FHT: 130 Cat 2 (reassuring), + acels, occasional variable decels with return baseline, moderate BTBV, overall reassuring                  TOCO: Q 2 minutes   Presentations: cephalic by ultrasound   Cervix:     Dilation: 3cm    Effacement: 75%    Station:  -2    Consistency: medium    Position: middle   Sterile Speculum Exam: grossly ruptured     EFW by Leopold's: 7.2    Lab Review  Blood Type B POS  GBBS: negative  Rubella: ind  RPR: NR  HIV: negative  HepB: negative       Assessment:       39w0d weeks gestation.      Active Hospital Problems    Diagnosis  POA    Uterine contractions during pregnancy [O62.2]  Yes      Resolved Hospital Problems    Diagnosis Date Resolved POA   No resolved problems to display.          Plan:      Risks, benefits, alternatives and possible complications have been discussed in detail with the patient.   - Consents signed and to chart  - Admit to Labor and Delivery unit  - Plan for expectant management    - Epidural per Anesthesia  - Draw CBC, T&S  - Notify Staff  - Recheck in 2 hrs or PRN    Post-Partum Hemorrhage risk - low          Zena Titus M.D.  OBGYN  PGY1

## 2018-07-01 NOTE — L&D DELIVERY NOTE
[facility]  Vaginal Delivery   Operative Note    SUMMARY     Normal spontaneous vaginal delivery of live infant, was placed on mothers abdomen for skin to skin and bulb suctioning performed.  Infant delivered position OA over intact perineum.  Nuchal cord: No.    Spontaneous delivery of placenta and IV pitocin given noting good uterine tone.  1st degree laceration noted Repaired with 2-0 vicryl. Bilateral labial lacerations, not repaired, hemostatic..  Patient tolerated delivery well. Sponge needle and lap counted correctly x2.    Indications: Uterine contractions during pregnancy  Pregnancy complicated by:   Patient Active Problem List   Diagnosis    G1;declines flu;    Uterine contractions during pregnancy     (spontaneous vaginal delivery)     Admitting GA: 39w0d    Delivery Information for  Girl Louann Kapoor    Birth information:  YOB: 2020   Time of birth: 1:12 PM   Sex: female   Head Delivery Date/Time: 2018  1:12 PM   Delivery type: Vaginal, Spontaneous Delivery   Gestational Age: 39w0d    Delivery Providers    Delivering clinician:  Tyesha Parham MD   Provider Role    MD Caroline Villegas MD Jessica Louise Grote, MD Toki W Carter, LETICIA Wood, LETICIA Reddy,              Hilo Measurements    Weight:  3657 g  Length:  50.8 cm  Head circumference:  34.3 cm  Chest circumference:  35.6 cm          Assessment    Living status:  Living  Apgars:     1 Minute:   5 Minute:   10 Minute:   15 Minute:   20 Minute:     Skin Color:   0  1       Heart Rate:   2  2       Reflex Irritability:   2  2       Muscle Tone:   2  2       Respiratory Effort:   2  2       Total:   8  9                      Assisted Delivery Details:    Forceps attempted?:  No  Vacuum extractor attempted?:  No         Shoulder Dystocia    Shoulder dystocia present?:  No           Presentation and Position    Presentation:   Vertex  Position:  Occiput Anterior           Interventions/Resuscitation    Method:  Bulb Suctioning, Tactile Stimulation       Cord    Vessels:  3 vessels  Complications:  None  Delayed Cord Clamping?:  Yes  Cord Blood Disposition:  Sent with Baby  Gases Sent?:  No  Stem Cell Collection (by MD):  No       Placenta    Date and time:  2018  1:19 PM  Removal:  Spontaneous  Appearance:  Intact           Labor Events:       labor: No     Labor Onset Date/Time:         Dilation Complete Date/Time:         Start Pushing Date/Time:       Rupture Date/Time:              Rupture type:           Fluid Amount:        Fluid Color:        Fluid Odor:        Membrane Status (PeriCalm):        Rupture Date/Time (PeriCalm):        Fluid Amount (PeriCalm):        Fluid Color (PeriCalm):         steroids:       Antibiotics given for GBS: No     Induction:       Indications for induction:        Augmentation:       Indications for augmentation:       Labor complications: None     Additional complications:          Cervical ripening:                     Delivery:      Episiotomy: None     Indication for Episiotomy:       Perineal Lacerations: 1st Repaired:  Yes   Periurethral Laceration: none Repaired:     Labial Laceration: none Repaired:     Sulcus Laceration: none Repaired:     Vaginal Laceration: No Repaired:     Cervical Laceration: No Repaired:     Repair suture:       Repair # of packets: 1     Vaginal delivery QBL (mL): 110      QBL (mL): 0     Combined Blood Loss (mL): 110     Vaginal Sweep Performed: Yes     Surgicount Correct:         Other providers:       Anesthesia    Method:  None          Details (if applicable):  Trial of Labor      Categorization:      Priority:     Indications for :     Incision Type:       Additional  information:  Forceps:    Vacuum:    Breech:    Observed anomalies    Other (Comments):

## 2018-07-01 NOTE — ED PROVIDER NOTES
"Encounter Date: 2018       History     Chief Complaint   Patient presents with    Contractions     Louann Kapoor is a 28 y.o. F at 39w0d presents complaining of leakage of clear fluid at 4 AM. Kaleigh every 2 mins.   This IUP is complicated by nothing.  Patient reports contractions, denies vaginal bleeding, reports LOF.   Fetal Movement: normal.            Review of patient's allergies indicates:  No Known Allergies  No past medical history on file.  No past surgical history on file.  Family History   Problem Relation Age of Onset    Breast cancer Neg Hx     Colon cancer Neg Hx     Diabetes Neg Hx     Hypertension Neg Hx     Eclampsia Neg Hx     Stroke Neg Hx     Miscarriages / Stillbirths Neg Hx     Ovarian cancer Neg Hx      labor Neg Hx      Social History   Substance Use Topics    Smoking status: Never Smoker    Smokeless tobacco: Never Used    Alcohol use No     Review of Systems   Constitutional: Negative for chills, fatigue and fever.   HENT: Negative for congestion.    Eyes: Negative for visual disturbance.   Respiratory: Negative for cough and shortness of breath.    Cardiovascular: Negative for chest pain and palpitations.   Gastrointestinal: Positive for abdominal pain. Negative for abdominal distention, constipation, diarrhea, nausea and vomiting.   Genitourinary: Positive for vaginal discharge. Negative for difficulty urinating, dysuria, hematuria and vaginal bleeding.   Skin: Negative for rash.   Neurological: Negative for dizziness, seizures, light-headedness and headaches.   Hematological: Does not bruise/bleed easily.   Psychiatric/Behavioral: Negative for dysphoric mood. The patient is not nervous/anxious.        Physical Exam     Initial Vitals   BP Pulse Resp Temp SpO2   -- -- -- -- --      MAP       --       /71 (BP Location: Right arm, Patient Position: Lying)   Pulse 75   Temp 97.9 °F (36.6 °C) (Oral)   Resp 16   Ht 5' 7" (1.702 m)   Wt 79.4 kg " (175 lb)   LMP 10/01/2017   SpO2 100%   Breastfeeding? No   BMI 27.41 kg/m²     Physical Exam    Vitals reviewed.  Constitutional: She appears well-developed and well-nourished.   Appears to be laboring, uncomfortable     Cardiovascular: Normal rate.   Pulmonary/Chest: No respiratory distress.   Abdominal: Soft. She exhibits no distension. There is no tenderness. There is no rebound and no guarding.   Gravid     Musculoskeletal: She exhibits no edema or tenderness.   Neurological: She is alert and oriented to person, place, and time.   Skin: Skin is warm and dry.   Psychiatric: She has a normal mood and affect. Her behavior is normal. Judgment and thought content normal.     OB LABOR EXAM:   Pre-Term Labor: No.   Membranes ruptured: Yes.   Method: Sterile vaginal exam per MD and Sterile speculum exam per MD.   Vaginal Bleeding: none present.     Dilatation: 3.   Station: -2.   Effacement: 70%.   Amniotic Fluid Color: clear.           ED Course   Obtain Fetal nonstress test (NST)  Date/Time: 7/1/2018 9:26 AM  Performed by: RICHARD CLEMENTE  Authorized by: RICHARD CLEMENTE     Nonstress Test:     Variability:  6-25 BPM    Decelerations:  Variable    Accelerations:  15 bpm    Baseline:  130    Contractions:  Regular    Contraction Frequency:  2  Biophysical Profile:     Nonstress Test Interpretation: reactive      Overall Impression:  Reassuring  Post-procedure:      Will continue to watch for variables.         Labs Reviewed - No data to display       Imaging Results    None          Medical Decision Making:   ED Management:  - NST reactive and reassuring  - cervix 3/70/-2, grossly ruptured  - vertex by scan  - staff notified  - GBS negative  - consents signed  - to L&D for delivery               Attending Attestation:   Physician Attestation Statement for Resident:  As the supervising MD   Physician Attestation Statement: I have personally seen and examined this patient.   I agree with the above history. -:   As  the supervising MD I agree with the above PE.    As the supervising MD I agree with the above treatment, course, plan, and disposition.   -: Patient evaluated and found to be stable, agree with resident's assessment and plan.  I was personally present during the critical portions of the procedure(s) performed by the resident and was immediately available in the ED to provide services and assistance as needed during the entire procedure.  I have reviewed the following: old records at this facility.                    ED Course as of Jul 01 0921   Sun Jul 01, 2018   0908 G1 at 39 weeks presents with contractions and ruptured membranes. Pregnancy uncomplicated, GBBS neg.  reactive, contractions every 2-3 minutes  [AR]      ED Course User Index  [AR] Lorena Matute MD     Clinical Impression:   The primary encounter diagnosis was Uterine contractions during pregnancy. Diagnoses of 39 weeks gestation of pregnancy and Rupture of membranes with clear amniotic fluid were also pertinent to this visit.      Disposition:   Disposition: Admitted  Condition: Stable                        Zena Titus MD  Resident  07/01/18 4822       Lorena Matute MD  07/01/18 1001

## 2018-07-01 NOTE — PROGRESS NOTES
"LABOR NOTE    S:  Complaints: Yes - pain.  Epidural working:  not applicable  MD to bedside for cervical check     O: /86   Pulse 89   Temp 97.3 °F (36.3 °C)   Resp 18   Ht 5' 7" (1.702 m)   Wt 79.4 kg (175 lb)   LMP 10/01/2017   SpO2 100%   Breastfeeding? No   BMI 27.41 kg/m²       FHT: 145 Cat 1 (reassuring)  CTX: q 3 minutes  SVE: /0      ASSESSMENT:   28 y.o.  at 39w0d,     FHT reassuring    Active Hospital Problems    Diagnosis  POA    *Uterine contractions during pregnancy [O62.2]  Yes     (spontaneous vaginal delivery) [O80]  Not Applicable      Resolved Hospital Problems    Diagnosis Date Resolved POA   No resolved problems to display.         PLAN:    Continue Close Maternal/Fetal Monitoring  Recheck 2 hours or PRN  Plan to set up to push in next hour when patient calls out.     "

## 2018-07-02 LAB
BASOPHILS # BLD AUTO: 0.01 K/UL
BASOPHILS NFR BLD: 0.1 %
DIFFERENTIAL METHOD: ABNORMAL
EOSINOPHIL # BLD AUTO: 0.1 K/UL
EOSINOPHIL NFR BLD: 0.5 %
ERYTHROCYTE [DISTWIDTH] IN BLOOD BY AUTOMATED COUNT: 13.4 %
HCT VFR BLD AUTO: 28.9 %
HGB BLD-MCNC: 9.6 G/DL
LYMPHOCYTES # BLD AUTO: 2.7 K/UL
LYMPHOCYTES NFR BLD: 18.7 %
MCH RBC QN AUTO: 28.6 PG
MCHC RBC AUTO-ENTMCNC: 33.2 G/DL
MCV RBC AUTO: 86 FL
MONOCYTES # BLD AUTO: 1.2 K/UL
MONOCYTES NFR BLD: 8.5 %
NEUTROPHILS # BLD AUTO: 10.1 K/UL
NEUTROPHILS NFR BLD: 71.4 %
PLATELET # BLD AUTO: 217 K/UL
PMV BLD AUTO: 10 FL
RBC # BLD AUTO: 3.36 M/UL
WBC # BLD AUTO: 14.19 K/UL

## 2018-07-02 PROCEDURE — 25000003 PHARM REV CODE 250: Performed by: STUDENT IN AN ORGANIZED HEALTH CARE EDUCATION/TRAINING PROGRAM

## 2018-07-02 PROCEDURE — 36415 COLL VENOUS BLD VENIPUNCTURE: CPT

## 2018-07-02 PROCEDURE — 11000001 HC ACUTE MED/SURG PRIVATE ROOM

## 2018-07-02 PROCEDURE — 99024 POSTOP FOLLOW-UP VISIT: CPT | Mod: ,,, | Performed by: OBSTETRICS & GYNECOLOGY

## 2018-07-02 PROCEDURE — 85025 COMPLETE CBC W/AUTO DIFF WBC: CPT

## 2018-07-02 RX ADMIN — DOCUSATE SODIUM 200 MG: 100 CAPSULE, LIQUID FILLED ORAL at 09:07

## 2018-07-02 RX ADMIN — NITROFURANTOIN (MONOHYDRATE/MACROCRYSTALS) 100 MG: 75; 25 CAPSULE ORAL at 08:07

## 2018-07-02 RX ADMIN — IBUPROFEN 600 MG: 600 TABLET ORAL at 12:07

## 2018-07-02 RX ADMIN — IBUPROFEN 600 MG: 600 TABLET ORAL at 05:07

## 2018-07-02 RX ADMIN — IBUPROFEN 600 MG: 600 TABLET ORAL at 06:07

## 2018-07-02 RX ADMIN — NITROFURANTOIN (MONOHYDRATE/MACROCRYSTALS) 100 MG: 75; 25 CAPSULE ORAL at 09:07

## 2018-07-02 NOTE — LACTATION NOTE
07/02/18 0850   Maternal Infant Assessment   Breast Shape Bilateral:;round   Breast Density Bilateral:;soft   Areola Bilateral:;elastic   Nipple(s) Bilateral:;graspable   Nipple Symptoms bilateral:;tender   LATCH Score   Latch 2-->grasps breast, tongue down, lips flanged, rhythmic sucking   Audible Swallowing 2-->spontaneous and intermittent (24 hrs old)   Type Of Nipple 2-->everted (after stimulation)   Comfort (Breast/Nipple) 2-->soft/nontender   Hold (Positioning) 1-->minimal assist, teach one side: mother does other, staff holds   Score (less than 7 for 2/more consecutive times, consult Lactation Consultant) 9   Maternal Infant Feeding   Maternal Emotional State assist needed;relaxed   Infant Positioning cross-cradle   Signs of Milk Transfer audible swallow   Presence of Pain no   Time Spent (min) 15-30 min   Latch Assistance yes   Feeding Infant   Feeding Readiness Cues rooting;crying   Effective Latch During Feeding yes   Audible Swallow yes   Suck/Swallow Coordination present   Skin-to-Skin Contact During Feeding yes   Lactation Referrals   Lactation Consult Breastfeeding assessment;Knowledge deficit;Initial assessment   Lactation Interventions   Attachment Promotion breastfeeding assistance provided;counseling provided;skin-to-skin contact encouraged   Breastfeeding Assistance assisted with positioning;feeding cue recognition promoted;infant latch-on verified;infant suck/swallow verified   Maternal Breastfeeding Support diary/feeding log utilized;encouragement offered;lactation counseling provided   Latch Promotion positioning assisted;infant moved to breast   Baby's t shirt removed to position the baby skin to skin. Assisted mother with positioning in cross cradle and latch. Baby latched well with wide gape. Mother stated she had no discomfort or pain with latch. Baby nursed well with audible swallows. Basics reviewed.

## 2018-07-02 NOTE — DISCHARGE INSTRUCTIONS
Breastfeeding Discharge Instructions       Feed the baby at the earliest sign of hunger or comfort  o Hands to mouth, sucking motions  o Rooting or searching for something to suck on  o Dont wait for crying - it is a sign of distress     The feedings may be 8-12 times per 24hrs and will not follow a schedule   Avoid pacifiers and bottles for the first 4 weeks   Alternate the breast you start the feeding with, or start with the breast that feels the fullest   Switch breasts when the baby takes himself off the breast or falls asleep   Keep offering breasts until the baby looks full, no longer gives hunger signs, and stays asleep when placed on his back in the crib   If the baby is sleepy and wont wake for a feeding, put the baby skin-to-skin dressed in a diaper against the mothers bare chest   Sleep near your baby   The baby should be positioned and latched on to the breast correctly  o Chest-to-chest, chin in the breast  o Babys lips are flipped outward  o Babys mouth is stretched open wide like a shout  o Babys sucking should feel like tugging to the mother  - The baby should be drinking at the breast:  o You should hear swallowing or gulping throughout the feeding  o You should see milk on the babys lips when he comes off the breast  o Your breasts should be softer when the baby is finished feeding  o The baby should look relaxed at the end of feedings  o After the 4th day and your milk is in:  o The babys poop should turn bright yellow and be loose, watery, and seedy  o The baby should have at least 3-4 poops the size of the palm of your hand per day  o The baby should have at least 5-6 wet diapers per day  o The urine should be light yellow in color  You should drink when you are thirsty and eat a healthy diet when you are    hungry.     Take naps to get the rest you need.   Take medications and/or drink alcohol only with permission of your obstetrician    or the babys pediatrician.  You can  also call the Infant Risk Center,   ([phone]), Monday-Friday, 8am-5pm Central time, to get the most   up-to-date evidence-based information on the use of medications during   pregnancy and breastfeeding.      The baby should be examined by a pediatrician at 3-5 days of age.  Once your   milk comes in, the baby should be gaining at least ½ - 1oz each day and should be back to birthweight no later than 10-14 days of age.          Community Resources    [facility] Breastfeeding Warmline: [phone]   Local [facility] clinics: provide incentives and breastpumps to eligible mothers  [employer] ([employer]):  mother-to-mother support group website        www.DocDepl.GigaBryte  Local La Leche League mother-to-mother support groups:        www.Qnovo        La Leche League [facility]   Dr. Joshua Urbina website for latch videos and general information:        www.breastfeedinginc.ca  [facility] Risk Center is a call center that provides information about the safety of taking medications while breastfeeding.  Call 1-785.477.5137, M-F, 8am-5pm, [place].  International Lactation Consultant Association provides resources for assistance:        www.ilca.org  [place] Breastfeeding Coalition provides informationand resources for parents  and the community    www.LaBreastfeedingSupport.org     Shaye Feldman is a mom-to-mom support group:                             www.SocialSmackdaliAdbrain.com//breastfeedng-support/  [employer] for Healthy Babies:  6-007-480-BABY(8947)  [employer]: a breastfeeding support group for women of color, 732.877.3257

## 2018-07-02 NOTE — PLAN OF CARE
Problem: Patient Care Overview  Goal: Plan of Care Review  Outcome: Ongoing (interventions implemented as appropriate)  Pt in no apparent distress. VSS. Ambulating without difficulty. Pain controlled with oral pain medication. Pt has no needs at this time.

## 2018-07-02 NOTE — PLAN OF CARE
Problem: Patient Care Overview  Goal: Plan of Care Review  Outcome: Ongoing (interventions implemented as appropriate)  Patient is breastfeeding and is to follow basic lactation education provided.

## 2018-07-02 NOTE — PROGRESS NOTES
POSTPARTUM PROGRESS NOTE     Louann Kapoor is a 28 y.o. female PPD #1 status post Spontaneous vaginal delivery at 39w0d in a pregnancy complicated by nothing. Patient is doing well this morning. She denies nausea, vomiting, fever or chills.  Patient reports moderate abdominal pain that is adequately relieved by oral pain medications. Lochia is mild to moderate  and stable. Patient is voiding without difficulty and ambulating with no difficulty. She has passed flatus, and has not had BM. Asked for colace overnight.  Patient does plan to breast feed. OCPs for contraception.     Objective:       Temp:  [97.3 °F (36.3 °C)-99 °F (37.2 °C)] 99 °F (37.2 °C)  Pulse:  [75-92] 90  Resp:  [16-18] 18  SpO2:  [97 %-100 %] 97 %  BP: (112-133)/(71-86) 121/78    General:   alert, appears stated age and cooperative   Lungs:   clear to auscultation bilaterally   Heart:   regular rate and rhythm, S1, S2 normal, no murmur, click, rub or gallop   Abdomen:  soft, non-tender; bowel sounds normal; no masses,  no organomegaly   Uterus:  firm located at the umblicus.            Extremities: peripheral pulses normal, no pedal edema, no clubbing or cyanosis     Lab Review  Recent Results (from the past 4 hour(s))   CBC auto differential    Collection Time: 07/02/18  4:41 AM   Result Value Ref Range    WBC 14.19 (H) 3.90 - 12.70 K/uL    RBC 3.36 (L) 4.00 - 5.40 M/uL    Hemoglobin 9.6 (L) 12.0 - 16.0 g/dL    Hematocrit 28.9 (L) 37.0 - 48.5 %    MCV 86 82 - 98 fL    MCH 28.6 27.0 - 31.0 pg    MCHC 33.2 32.0 - 36.0 g/dL    RDW 13.4 11.5 - 14.5 %    Platelets 217 150 - 350 K/uL    MPV 10.0 9.2 - 12.9 fL    Gran # (ANC) 10.1 (H) 1.8 - 7.7 K/uL    Lymph # 2.7 1.0 - 4.8 K/uL    Mono # 1.2 (H) 0.3 - 1.0 K/uL    Eos # 0.1 0.0 - 0.5 K/uL    Baso # 0.01 0.00 - 0.20 K/uL    Gran% 71.4 38.0 - 73.0 %    Lymph% 18.7 18.0 - 48.0 %    Mono% 8.5 4.0 - 15.0 %    Eosinophil% 0.5 0.0 - 8.0 %    Basophil% 0.1 0.0 - 1.9 %    Differential Method Automated         I/O    Intake/Output Summary (Last 24 hours) at 18 07  Last data filed at 18 2019   Gross per 24 hour   Intake                0 ml   Output              860 ml   Net             -860 ml        Assessment:     Patient Active Problem List   Diagnosis    G1;declines flu;    Uterine contractions during pregnancy     (spontaneous vaginal delivery)        Plan:   1. Postpartum care:  - Patient doing well. Continue routine management and advances.  - Continue PO pain meds. Pain well controlled.  - Heme: H/h 12.3/36.6 > 9.6/28.9.   - Encourage ambulation  - Contraception OCPs   - Lactation prn      Dispo: As patient meets milestones, will plan to discharge.      Jyothi Zavala MD  OBGYN PGY-1

## 2018-07-03 VITALS
HEART RATE: 92 BPM | HEIGHT: 67 IN | OXYGEN SATURATION: 99 % | RESPIRATION RATE: 18 BRPM | TEMPERATURE: 98 F | WEIGHT: 175 LBS | BODY MASS INDEX: 27.47 KG/M2 | DIASTOLIC BLOOD PRESSURE: 85 MMHG | SYSTOLIC BLOOD PRESSURE: 124 MMHG

## 2018-07-03 LAB
BASOPHILS # BLD AUTO: 0.01 K/UL
BASOPHILS NFR BLD: 0.1 %
DIFFERENTIAL METHOD: ABNORMAL
EOSINOPHIL # BLD AUTO: 0.3 K/UL
EOSINOPHIL NFR BLD: 2.3 %
ERYTHROCYTE [DISTWIDTH] IN BLOOD BY AUTOMATED COUNT: 13.6 %
HCT VFR BLD AUTO: 28.5 %
HGB BLD-MCNC: 9.3 G/DL
LYMPHOCYTES # BLD AUTO: 2.1 K/UL
LYMPHOCYTES NFR BLD: 18.7 %
MCH RBC QN AUTO: 28.4 PG
MCHC RBC AUTO-ENTMCNC: 32.6 G/DL
MCV RBC AUTO: 87 FL
MONOCYTES # BLD AUTO: 0.8 K/UL
MONOCYTES NFR BLD: 6.9 %
NEUTROPHILS # BLD AUTO: 7.9 K/UL
NEUTROPHILS NFR BLD: 71.1 %
PLATELET # BLD AUTO: 217 K/UL
PMV BLD AUTO: 9.3 FL
POCT GLUCOSE: 107 MG/DL (ref 70–110)
RBC # BLD AUTO: 3.27 M/UL
WBC # BLD AUTO: 11.08 K/UL

## 2018-07-03 PROCEDURE — 25000003 PHARM REV CODE 250: Performed by: STUDENT IN AN ORGANIZED HEALTH CARE EDUCATION/TRAINING PROGRAM

## 2018-07-03 PROCEDURE — 85025 COMPLETE CBC W/AUTO DIFF WBC: CPT

## 2018-07-03 PROCEDURE — 36415 COLL VENOUS BLD VENIPUNCTURE: CPT

## 2018-07-03 PROCEDURE — 59400 OBSTETRICAL CARE: CPT | Mod: ,,, | Performed by: OBSTETRICS & GYNECOLOGY

## 2018-07-03 RX ORDER — IRON POLYSACCHARIDE COMPLEX 150 MG
150 CAPSULE ORAL DAILY
Status: DISCONTINUED | OUTPATIENT
Start: 2018-07-03 | End: 2018-07-03 | Stop reason: HOSPADM

## 2018-07-03 RX ORDER — IRON POLYSACCHARIDE COMPLEX 150 MG
150 CAPSULE ORAL DAILY
Qty: 30 CAPSULE | Refills: 0 | COMMUNITY
Start: 2018-07-04 | End: 2022-02-16

## 2018-07-03 RX ORDER — DOCUSATE SODIUM 100 MG/1
100 CAPSULE, LIQUID FILLED ORAL DAILY
Qty: 10 CAPSULE | Refills: 0 | COMMUNITY
Start: 2018-07-04 | End: 2022-02-16

## 2018-07-03 RX ORDER — DOCUSATE SODIUM 100 MG/1
100 CAPSULE, LIQUID FILLED ORAL DAILY
Status: DISCONTINUED | OUTPATIENT
Start: 2018-07-03 | End: 2018-07-03 | Stop reason: HOSPADM

## 2018-07-03 RX ADMIN — IBUPROFEN 600 MG: 600 TABLET ORAL at 12:07

## 2018-07-03 RX ADMIN — NITROFURANTOIN (MONOHYDRATE/MACROCRYSTALS) 100 MG: 75; 25 CAPSULE ORAL at 08:07

## 2018-07-03 RX ADMIN — HYDROCODONE BITARTRATE AND ACETAMINOPHEN 1 TABLET: 7.5; 325 TABLET ORAL at 08:07

## 2018-07-03 RX ADMIN — Medication 150 MG: at 08:07

## 2018-07-03 RX ADMIN — DOCUSATE SODIUM 100 MG: 100 CAPSULE, LIQUID FILLED ORAL at 08:07

## 2018-07-03 RX ADMIN — IBUPROFEN 600 MG: 600 TABLET ORAL at 05:07

## 2018-07-03 NOTE — LACTATION NOTE
07/03/18 1110   Infant Information   Infant's Name Katherine   Infant's Medical Care Provider [facility]   Maternal Infant Assessment   Breast Shape Bilateral:;round   Breast Density Bilateral:;soft   Areola Bilateral:;elastic   Nipple(s) Bilateral:;graspable   Nipple Symptoms bilateral:;tender   Infant Assessment   Weight Loss (%) 5   Sucking Reflex present   Rooting Reflex present   Swallow Reflex present   LATCH Score   Latch 2-->grasps breast, tongue down, lips flanged, rhythmic sucking   Audible Swallowing 2-->spontaneous and intermittent (24 hrs old)   Type Of Nipple 2-->everted (after stimulation)   Comfort (Breast/Nipple) 2-->soft/nontender   Hold (Positioning) 1-->minimal assist, teach one side: mother does other, staff holds   Score (less than 7 for 2/more consecutive times, consult Lactation Consultant) 9   Maternal Infant Feeding   Maternal Emotional State assist needed   Infant Positioning cross-cradle   Signs of Milk Transfer audible swallow;infant jaw motion present   Presence of Pain other (see comments)  (initial tenderness, improved with deep latch to no pain)   Time Spent (min) 15-30 min   Nipple Shape After Feeding, Left round   Nipple Shape After Feeding, Right round   Latch Assistance yes   Breastfeeding Education adequate infant intake;adequate milk volume;diet;importance of skin-to-skin contact;increasing milk supply;label/storage of breast milk;medication effects;milk expression, hand;prenatal vitamins continued;milk expression, electric pump   Breastfeeding History   Currently Breastfeeding yes   Feeding Infant   Feeding Readiness Cues hand to mouth movements   Satiety Cues sleeping after feeding   Effective Latch During Feeding yes   Audible Swallow yes   Suck/Swallow Coordination present   Skin-to-Skin Contact During Feeding yes   Lactation Referrals   Lactation Consult Breast/nipple pain;Breastfeeding assessment;Follow up;Knowledge deficit   Lactation Interventions   Attachment  Promotion breastfeeding assistance provided;counseling provided;environment adjusted;face-to-face positioning promoted;family involvement promoted;infant-mother separation minimized;privacy provided;role responsibility promoted;rooming-in promoted;skin-to-skin contact encouraged   Breastfeeding Assistance assisted with positioning;both breasts offered each feeding;feeding cue recognition promoted;feeding session observed;infant latch-on verified;infant suck/swallow verified;support offered   Maternal Breastfeeding Support diary/feeding log utilized;encouragement offered;infant-mother separation minimized;lactation counseling provided   Latch Promotion positioning assisted   LC to room, provided lactation discharge education with breastfeeding guide. All questions answered. LC assisted with position and latch in cross cradle on both breasts, infant active, good tugs/pulls with audible swallows. Some breast compression and infant stimulation needed to keep infant active at breast. Mother has some initial tenderness but no pain with deep latch. Mother has Spectra pump for home use. Mother has lactation warmline number and additional resources on AVS.

## 2018-07-03 NOTE — PLAN OF CARE
Problem: Patient Care Overview  Goal: Plan of Care Review  Outcome: Ongoing (interventions implemented as appropriate)  Mother to breastfeed infant 8 or more times in 24hrs on infant's cue until content, frequent skin to skin, and will avoid bottles and pacifiers.  Mother is to keep infant actively feeding by keeping infant stimulated and using breast compression. Mother ensure effective nursing by hearing infant swallows and feeling nice tugs and pulls. Latch should not be painful while nursing.  Mother to record all breastfeedings, voids and stools in breastfeeding guide. Mother to call  for breastfeeding assistance or questions .  Refer breastfeeding guide for lactation education.

## 2018-07-03 NOTE — PROGRESS NOTES
To bedside for evaluation of tachycardia. Nurse called stating patient was having intermittent tachycardia in the 120s. Patient states that she has occasionally been having the sensation that her heart is beating faster than normal and has felt dizzy when walking around. She attributes this feeling to the Vicodin that she is taking for pain. Reports no increase in bleeding. No nausea or vomiting.     Temp:  [98 °F (36.7 °C)-98.6 °F (37 °C)] 98 °F (36.7 °C)  Pulse:  [] 88  Resp:  [18-20] 18  SpO2:  [98 %-99 %] 99 %  BP: (116-134)/(75-85) 124/85     Orthostatics:   Sitting 124/85      Pulse 88    Lying 125/78        Pulse 80    Standing 119/77  Pulse 92    Gen: Alert and oriented  Cardio: Regular rate and rhythm  Resp: Normal respiratory effort  Abd: Soft, non-tender      Recent Labs  Lab 07/01/18  0956 07/02/18  0441 07/03/18  1043   WBC 12.25 14.19* 11.08   HGB 12.3 9.6* 9.3*   HCT 36.6* 28.9* 28.5*   MCV 86 86 87    217 217          Vital signs stable. Orthostatics without significant change.   CBC stable.  No concern for acute process.  Continue to monitor.    Jyothi Zavala MD  OBGYN PGY-1

## 2018-07-03 NOTE — DISCHARGE SUMMARY
Delivery Discharge Summary  Obstetrics      Primary OB Clinician: Dr. Johanna Montana    Admission date: 2018  Discharge date: 2018    Disposition: To home, self care    Admit Dx:      Patient Active Problem List   Diagnosis    G1;declines flu;    Uterine contractions during pregnancy     (spontaneous vaginal delivery)     Discharge Dx:    Patient Active Problem List   Diagnosis    G1;declines flu;    Uterine contractions during pregnancy     (spontaneous vaginal delivery)       Procedure:     Hospital Course:  Luoann Kapoor is a 28 y.o. now , PPD #2 who was admitted on 2018 for labor. On initial assessment, vital signs were stable and physical exam was normal. Infant was in cephalic presentation. Patient was subsequently admitted to labor and delivery unit with signed consents.  Patient delivered a single viable  female via . Please see delivery note for further details. Pt was in stable condition post delivery and was transferred to the Mother-Baby Unit. Her postpartum course was not complicated. On discharge day, patient's pain is controlled with oral pain medications. Pt is tolerating ambulation without SOB or CP, and PO diet without N/V. Reports lochia is mild. Denies any HA, vision changes, F/C, LE swelling. Pt in stable condition and ready for discharge. Discharge instructions and precautions reviewed.    Pertinent studies:  Postpartum CBC  Lab Results   Component Value Date    WBC 14.19 (H) 2018    HGB 9.6 (L) 2018    HCT 28.9 (L) 2018    MCV 86 2018     2018         Tubal Ligation: n/a  Feeding Method: breast  Rh Immune Globulin Given(B POS): N/A  Rubella Vaccine Given: N/A  Tdap Vaccine Given: N/A    Delivery:    Episiotomy: None   Lacerations: 1st   Repair suture:     Repair # of packets: 1   Blood loss (ml): 110     Birth information:  YOB: 2020   Time of birth: 1:12 PM   Sex: female   Delivery type:  Vaginal, Spontaneous Delivery   Gestational Age: 39w0d    Delivery Clinician:      Other providers:       Additional  information:  Forceps:    Vacuum:    Breech:    Observed anomalies      Living?:           APGARS  One minute Five minutes Ten minutes   Skin color:         Heart rate:         Grimace:         Muscle tone:         Breathing:         Totals: 8  9        Placenta: Delivered:       appearance      Patient Instructions:   Current Discharge Medication List      START taking these medications    Details   ibuprofen (ADVIL,MOTRIN) 600 MG tablet Take 1 tablet (600 mg total) by mouth every 6 (six) hours as needed (cramping).  Qty: 30 tablet, Refills: 2         CONTINUE these medications which have NOT CHANGED    Details   doxylamine-pyridoxine, vit B6, (DICLEGIS) 10-10 mg TbEC Take 2 tablets by mouth every evening. If no improvement in nausea, can take 1 pill in morning and 2 at bedtime.  Qty: 120 tablet, Refills: 1    Associated Diagnoses: Nausea/vomiting in pregnancy      nitrofurantoin (MACRODANTIN) 50 MG capsule Take 1 capsule (50 mg total) by mouth every 6 (six) hours.  Qty: 30 capsule, Refills: 6      PRENATAL 25/IRON/FOLATE 6/DHA (PRENA1 ORAL) Take by mouth.               Discharge Procedure Orders  Diet Adult Regular     Notify your health care provider if you experience any of the following:  temperature >100.4     Notify your health care provider if you experience any of the following:  persistent nausea and vomiting or diarrhea     Notify your health care provider if you experience any of the following:  severe uncontrolled pain     Notify your health care provider if you experience any of the following:  difficulty breathing or increased cough     Notify your health care provider if you experience any of the following:  severe persistent headache     Notify your health care provider if you experience any of the following:  persistent dizziness, light-headedness, or visual disturbances     Notify  your health care provider if you experience any of the following:  increased confusion or weakness     Activity as tolerated         Jyothi aZvala MD  OBGYN PGY-1

## 2018-07-03 NOTE — PLAN OF CARE
Problem: Patient Care Overview  Goal: Plan of Care Review  Outcome: Outcome(s) achieved Date Met: 07/03/18  Pt ambulating, voiding, and passing flatus. Pt tolerating PO well and there is no SS of distress at this time. Pt's pain well controlled throughout shift by oral pain medication. Pt's bleeding has been light throughout shift and fundus is firm. Pt experienced mild tachycardia this morning (asymptomatic). Pt's HR has since been within normal limits. Pt cleared by OB to be discharged this afternoon. Mother Baby Care Guide reviewed on previous shift. Pt and spouse verbalized understanding. Pt and spouse also verbalized understanding that the pt must be seen by her PCP in 6 weeks for a follow up visit.

## 2018-07-03 NOTE — PROGRESS NOTES
POSTPARTUM PROGRESS NOTE     Louann Kapoor is a 28 y.o. female PPD #2 status post Spontaneous vaginal delivery at 39w0d in a pregnancy complicated by nothing. Patient is doing well this morning. She denies nausea, vomiting, fever or chills.  Patient reports mild abdominal pain that is adequately relieved by oral pain medications. Lochia is mild to moderate  and stable. Patient is voiding without difficulty and ambulating with no difficulty. She has passed flatus, and has not had BM.  Patient does plan to breast feed. Contraception to be discussed with primary OB.     Objective:       Temp:  [98.1 °F (36.7 °C)-98.6 °F (37 °C)] 98.6 °F (37 °C)  Pulse:  [102-104] 104  Resp:  [18-20] 20  SpO2:  [98 %] 98 %  BP: (116-134)/(75-81) 116/75    General:   alert, appears stated age and cooperative   Lungs:   clear to auscultation bilaterally   Heart:   regular rate and rhythm, S1, S2 normal, no murmur, click, rub or gallop   Abdomen:  soft, non-tender; bowel sounds normal; no masses,  no organomegaly and non-distended   Uterus:  firm located at the umblicus.            Extremities: peripheral pulses normal, no pedal edema, no clubbing or cyanosis     Lab Review  No results found for this or any previous visit (from the past 4 hour(s)).    I/O  No intake or output data in the 24 hours ending 18 0626     Assessment:     Patient Active Problem List   Diagnosis    G1;declines flu;    Uterine contractions during pregnancy     (spontaneous vaginal delivery)        Plan:   1. Postpartum care:  - PPD#2.  Patient doing well. Continue routine management and advances.  - Continue PO pain meds. Pain well controlled.  - Heme: H/h 12.3/36.6 > 9.6/28.9.  Iron and colace will be given on discharge.  - Encourage ambulation  - Contraception per Dr. Montana  - Lactation prn.      Dispo: As patient meets milestones, will plan to discharge today.    Jyothi Zavala MD  OBGYN PGY-1

## 2018-07-05 NOTE — PHYSICIAN QUERY
PT Name: Louann Kapoor  MR #: [id number]     Physician Query Form - Documentation Clarification      CDS/: ERIN Schilling,RNC-MNN           Contact information:[email or web address]    This form is a permanent document in the medical record.     Query Date: 2018    By submitting this query, we are merely seeking further clarification of documentation. Please utilize your independent clinical judgment when addressing the question(s) below.    The Medical record reflects the following:    Supporting Clinical Findings Location in Medical Record   F at 39w0d presents complaining of leakage of clear fluid at 4 AM. Kaleigh every 2 mins    28 y.o. female without significant PMH who presents in active labor    Normal spontaneous vaginal delivery of live infant H&P         Anesthesia note       L&D Delivery note                                                                                       Doctor, Please specify diagnosis or diagnoses associated with above clinical findings.    Provider Use Only      [ x ] PROM with onset of labor within 24 hours  [  ] PROM with onset of labor after 24 hours  [  ] PROM other or unspecified  [  ] SROM occurring after the onset of labor  [  ] Other, please specify:______________________________________                                                                                                                 [  ] Clinically undetermined

## 2018-07-05 NOTE — PHYSICIAN QUERY
PT Name: Louann Kapoor  MR #: [id number]     Physician Query Form - Medication-Correlation for Diagnosis      CDS/: ERIN Schilling,RNC-MNN           Contact information:[email or web address]     This form is a permanent document in the medical record.     Query Date: July 5, 2018      By submitting this query, we are merely seeking further clarification of documentation.  Please utilize your independent clinical judgment when addressing the question(s) below.    The medical record contains the following:  The patient has an order for the following medication(s):    nitrofurantoin (macrocrystal-monohydrate) 100 MG capsule 100 mg         Please provider the corresponding diagnosis or diagnoses that support(s) the use of the medication(s)   Physician Use Only    History of nephrolithiasis

## 2018-07-26 ENCOUNTER — PATIENT MESSAGE (OUTPATIENT)
Dept: OBSTETRICS AND GYNECOLOGY | Facility: CLINIC | Age: 28
End: 2018-07-26

## 2018-07-26 ENCOUNTER — TELEPHONE (OUTPATIENT)
Dept: OBSTETRICS AND GYNECOLOGY | Facility: CLINIC | Age: 28
End: 2018-07-26

## 2018-08-12 ENCOUNTER — PATIENT MESSAGE (OUTPATIENT)
Dept: OBSTETRICS AND GYNECOLOGY | Facility: CLINIC | Age: 28
End: 2018-08-12

## 2018-09-25 ENCOUNTER — POSTPARTUM VISIT (OUTPATIENT)
Dept: OBSTETRICS AND GYNECOLOGY | Facility: CLINIC | Age: 28
End: 2018-09-25
Attending: OBSTETRICS & GYNECOLOGY
Payer: COMMERCIAL

## 2018-09-25 VITALS
HEIGHT: 67 IN | BODY MASS INDEX: 23.47 KG/M2 | DIASTOLIC BLOOD PRESSURE: 68 MMHG | WEIGHT: 149.56 LBS | SYSTOLIC BLOOD PRESSURE: 118 MMHG

## 2018-09-25 PROBLEM — Z34.01 ENCOUNTER FOR SUPERVISION OF NORMAL FIRST PREGNANCY IN FIRST TRIMESTER: Status: RESOLVED | Noted: 2017-12-01 | Resolved: 2018-09-25

## 2018-09-25 PROBLEM — O47.9 UTERINE CONTRACTIONS DURING PREGNANCY: Status: RESOLVED | Noted: 2018-07-01 | Resolved: 2018-09-25

## 2018-09-25 PROCEDURE — 0503F POSTPARTUM CARE VISIT: CPT | Mod: S$GLB,,, | Performed by: OBSTETRICS & GYNECOLOGY

## 2018-09-25 RX ORDER — ACETAMINOPHEN AND CODEINE PHOSPHATE 120; 12 MG/5ML; MG/5ML
1 SOLUTION ORAL DAILY
Qty: 30 TABLET | Refills: 11 | Status: SHIPPED | OUTPATIENT
Start: 2018-09-25 | End: 2021-03-29

## 2018-09-25 NOTE — PROGRESS NOTES
"CC: Post-partum follow-up    HPI:  Louann Kapoor is a 28 y.o. female  presents for post-partum visit s/p a .  Doing well, reports she continues to breast feed, baby has colic but she is doing ok, has stopped all dairy and this seems to help.  No naginal bleeding, pain resolved.      Delivery Date: 2018  Delivery MD: Prasad  Gender: female- Katherine  Birth Weight: 3657g  Breast Feeding: YES  Depression: NO  Contraception: oral progesterone-only contraceptive    Pregnancy was complicated by:  none  ROS:  GENERAL: No fever, chills, fatigability or weight loss.  VULVAR: No pain, no lesions and no itching.  VAGINAL: No relaxation, no itching, no discharge, no abnormal bleeding and no lesions.  ABDOMEN: No abdominal pain. Denies nausea. Denies vomiting. No diarrhea. No constipation  BREAST: Denies pain. No lumps. No discharge.  URINARY: No incontinence, no nocturia, no frequency and no dysuria.  CARDIOVASCULAR: No chest pain. No shortness of breath. No leg cramps.  NEUROLOGICAL: No headaches. No vision changes.    PHYSICAL EXAM:  /68   Ht 5' 7" (1.702 m)   Wt 67.8 kg (149 lb 9.3 oz)   Breastfeeding? Yes   BMI 23.43 kg/m²    GEN: AAO x 3, NAD  ABD: soft, NTTP  PELVIC: exam chaperoned by nurse, normal vagina and vulva, multiparous os, uterus normal size, shape, consistency, no mass or tenderness  PSYCH: normal mood and affect        Diagnosis:  1. Routine postpartum follow-up        Plan:   Mini pill  Resume normal activity  F/U for annual         Patient was counseled today on A.C.S. Pap guidelines and recommendations for yearly pelvic exams and monthly self breast exams; to see her PCP for other health maintenance.      "

## 2021-03-03 ENCOUNTER — TELEPHONE (OUTPATIENT)
Dept: OBSTETRICS AND GYNECOLOGY | Facility: CLINIC | Age: 31
End: 2021-03-03

## 2021-03-29 ENCOUNTER — LAB VISIT (OUTPATIENT)
Dept: LAB | Facility: OTHER | Age: 31
End: 2021-03-29
Attending: OBSTETRICS & GYNECOLOGY
Payer: COMMERCIAL

## 2021-03-29 ENCOUNTER — OFFICE VISIT (OUTPATIENT)
Dept: OBSTETRICS AND GYNECOLOGY | Facility: CLINIC | Age: 31
End: 2021-03-29
Attending: OBSTETRICS & GYNECOLOGY
Payer: COMMERCIAL

## 2021-03-29 VITALS
HEIGHT: 67 IN | DIASTOLIC BLOOD PRESSURE: 95 MMHG | BODY MASS INDEX: 23.29 KG/M2 | WEIGHT: 148.38 LBS | SYSTOLIC BLOOD PRESSURE: 146 MMHG

## 2021-03-29 DIAGNOSIS — N91.2 AMENORRHEA: ICD-10-CM

## 2021-03-29 DIAGNOSIS — N91.2 AMENORRHEA: Primary | ICD-10-CM

## 2021-03-29 DIAGNOSIS — N76.0 ACUTE VAGINITIS: ICD-10-CM

## 2021-03-29 LAB
B-HCG UR QL: NEGATIVE
CTP QC/QA: YES
HCG INTACT+B SERPL-ACNC: <1.2 MIU/ML

## 2021-03-29 PROCEDURE — 99999 PR PBB SHADOW E&M-EST. PATIENT-LVL III: ICD-10-PCS | Mod: PBBFAC,,, | Performed by: OBSTETRICS & GYNECOLOGY

## 2021-03-29 PROCEDURE — 84702 CHORIONIC GONADOTROPIN TEST: CPT | Performed by: OBSTETRICS & GYNECOLOGY

## 2021-03-29 PROCEDURE — 87661 TRICHOMONAS VAGINALIS AMPLIF: CPT | Mod: 59 | Performed by: OBSTETRICS & GYNECOLOGY

## 2021-03-29 PROCEDURE — 99213 PR OFFICE/OUTPT VISIT, EST, LEVL III, 20-29 MIN: ICD-10-PCS | Mod: S$GLB,,, | Performed by: OBSTETRICS & GYNECOLOGY

## 2021-03-29 PROCEDURE — 87481 CANDIDA DNA AMP PROBE: CPT | Mod: 59 | Performed by: OBSTETRICS & GYNECOLOGY

## 2021-03-29 PROCEDURE — 81025 POCT URINE PREGNANCY: ICD-10-PCS | Mod: S$GLB,,, | Performed by: OBSTETRICS & GYNECOLOGY

## 2021-03-29 PROCEDURE — 36415 COLL VENOUS BLD VENIPUNCTURE: CPT | Performed by: OBSTETRICS & GYNECOLOGY

## 2021-03-29 PROCEDURE — 99213 OFFICE O/P EST LOW 20 MIN: CPT | Mod: S$GLB,,, | Performed by: OBSTETRICS & GYNECOLOGY

## 2021-03-29 PROCEDURE — 99999 PR PBB SHADOW E&M-EST. PATIENT-LVL III: CPT | Mod: PBBFAC,,, | Performed by: OBSTETRICS & GYNECOLOGY

## 2021-03-29 PROCEDURE — 81025 URINE PREGNANCY TEST: CPT | Mod: S$GLB,,, | Performed by: OBSTETRICS & GYNECOLOGY

## 2021-03-29 RX ORDER — MEDROXYPROGESTERONE ACETATE 10 MG/1
10 TABLET ORAL DAILY
Qty: 10 TABLET | Refills: 0 | Status: SHIPPED | OUTPATIENT
Start: 2021-03-29 | End: 2022-02-16

## 2021-03-30 LAB
BACTERIAL VAGINOSIS DNA: NEGATIVE
CANDIDA GLABRATA DNA: NEGATIVE
CANDIDA KRUSEI DNA: NEGATIVE
CANDIDA RRNA VAG QL PROBE: NEGATIVE
T VAGINALIS RRNA GENITAL QL PROBE: NEGATIVE

## 2021-03-31 ENCOUNTER — HOSPITAL ENCOUNTER (OUTPATIENT)
Dept: RADIOLOGY | Facility: OTHER | Age: 31
Discharge: HOME | End: 2021-03-31
Attending: OBSTETRICS & GYNECOLOGY
Payer: COMMERCIAL

## 2021-03-31 DIAGNOSIS — N91.2 AMENORRHEA: ICD-10-CM

## 2021-03-31 PROCEDURE — 76830 US PELVIS COMP WITH TRANSVAG NON-OB (XPD): ICD-10-PCS | Mod: 26,,, | Performed by: RADIOLOGY

## 2021-03-31 PROCEDURE — 76856 US PELVIS COMP WITH TRANSVAG NON-OB (XPD): ICD-10-PCS | Mod: 26,,, | Performed by: RADIOLOGY

## 2021-03-31 PROCEDURE — 76830 TRANSVAGINAL US NON-OB: CPT | Mod: 26,,, | Performed by: RADIOLOGY

## 2021-03-31 PROCEDURE — 76856 US EXAM PELVIC COMPLETE: CPT | Mod: TC

## 2021-03-31 PROCEDURE — 76856 US EXAM PELVIC COMPLETE: CPT | Mod: 26,,, | Performed by: RADIOLOGY

## 2021-04-05 ENCOUNTER — PATIENT MESSAGE (OUTPATIENT)
Dept: OBSTETRICS AND GYNECOLOGY | Facility: CLINIC | Age: 31
End: 2021-04-05

## 2021-04-07 RX ORDER — NORETHINDRONE ACETATE AND ETHINYL ESTRADIOL, ETHINYL ESTRADIOL AND FERROUS FUMARATE 1MG-10(24)
1 KIT ORAL DAILY
Qty: 28 TABLET | Refills: 11 | Status: SHIPPED | OUTPATIENT
Start: 2021-04-07 | End: 2022-02-16

## 2021-05-24 ENCOUNTER — PATIENT MESSAGE (OUTPATIENT)
Dept: OBSTETRICS AND GYNECOLOGY | Facility: CLINIC | Age: 31
End: 2021-05-24

## 2021-09-20 ENCOUNTER — OFFICE VISIT (OUTPATIENT)
Dept: OBSTETRICS AND GYNECOLOGY | Facility: CLINIC | Age: 31
End: 2021-09-20
Attending: OBSTETRICS & GYNECOLOGY
Payer: MEDICAID

## 2021-09-20 ENCOUNTER — LAB VISIT (OUTPATIENT)
Dept: LAB | Facility: OTHER | Age: 31
End: 2021-09-20
Attending: OBSTETRICS & GYNECOLOGY
Payer: COMMERCIAL

## 2021-09-20 VITALS — WEIGHT: 138 LBS | SYSTOLIC BLOOD PRESSURE: 104 MMHG | BODY MASS INDEX: 21.62 KG/M2 | DIASTOLIC BLOOD PRESSURE: 68 MMHG

## 2021-09-20 DIAGNOSIS — N92.6 IRREGULAR MENSES: ICD-10-CM

## 2021-09-20 DIAGNOSIS — R10.2 PELVIC PAIN IN FEMALE: ICD-10-CM

## 2021-09-20 DIAGNOSIS — R10.9 FLANK PAIN: Primary | ICD-10-CM

## 2021-09-20 DIAGNOSIS — Z12.4 SCREENING FOR MALIGNANT NEOPLASM OF THE CERVIX: ICD-10-CM

## 2021-09-20 LAB
ALBUMIN SERPL BCP-MCNC: 4.7 G/DL (ref 3.5–5.2)
ALP SERPL-CCNC: 58 U/L (ref 55–135)
ALT SERPL W/O P-5'-P-CCNC: 8 U/L (ref 10–44)
ANION GAP SERPL CALC-SCNC: 11 MMOL/L (ref 8–16)
AST SERPL-CCNC: 17 U/L (ref 10–40)
BASOPHILS # BLD AUTO: 0.01 K/UL (ref 0–0.2)
BASOPHILS NFR BLD: 0.2 % (ref 0–1.9)
BILIRUB SERPL-MCNC: 0.7 MG/DL (ref 0.1–1)
BUN SERPL-MCNC: 10 MG/DL (ref 6–20)
CALCIUM SERPL-MCNC: 9.9 MG/DL (ref 8.7–10.5)
CHLORIDE SERPL-SCNC: 107 MMOL/L (ref 95–110)
CO2 SERPL-SCNC: 23 MMOL/L (ref 23–29)
CREAT SERPL-MCNC: 0.9 MG/DL (ref 0.5–1.4)
DIFFERENTIAL METHOD: NORMAL
EOSINOPHIL # BLD AUTO: 0.1 K/UL (ref 0–0.5)
EOSINOPHIL NFR BLD: 1.4 % (ref 0–8)
ERYTHROCYTE [DISTWIDTH] IN BLOOD BY AUTOMATED COUNT: 12.7 % (ref 11.5–14.5)
EST. GFR  (AFRICAN AMERICAN): >60 ML/MIN/1.73 M^2
EST. GFR  (NON AFRICAN AMERICAN): >60 ML/MIN/1.73 M^2
GLUCOSE SERPL-MCNC: 90 MG/DL (ref 70–110)
HCT VFR BLD AUTO: 41 % (ref 37–48.5)
HGB BLD-MCNC: 13.4 G/DL (ref 12–16)
IMM GRANULOCYTES # BLD AUTO: 0.02 K/UL (ref 0–0.04)
IMM GRANULOCYTES NFR BLD AUTO: 0.3 % (ref 0–0.5)
LYMPHOCYTES # BLD AUTO: 2.3 K/UL (ref 1–4.8)
LYMPHOCYTES NFR BLD: 35.5 % (ref 18–48)
MCH RBC QN AUTO: 28.2 PG (ref 27–31)
MCHC RBC AUTO-ENTMCNC: 32.7 G/DL (ref 32–36)
MCV RBC AUTO: 86 FL (ref 82–98)
MONOCYTES # BLD AUTO: 0.5 K/UL (ref 0.3–1)
MONOCYTES NFR BLD: 7.4 % (ref 4–15)
NEUTROPHILS # BLD AUTO: 3.6 K/UL (ref 1.8–7.7)
NEUTROPHILS NFR BLD: 55.2 % (ref 38–73)
NRBC BLD-RTO: 0 /100 WBC
PLATELET # BLD AUTO: 219 K/UL (ref 150–450)
PMV BLD AUTO: 10.9 FL (ref 9.2–12.9)
POTASSIUM SERPL-SCNC: 4.2 MMOL/L (ref 3.5–5.1)
PROT SERPL-MCNC: 8.1 G/DL (ref 6–8.4)
RBC # BLD AUTO: 4.75 M/UL (ref 4–5.4)
SODIUM SERPL-SCNC: 141 MMOL/L (ref 136–145)
T4 FREE SERPL-MCNC: 1.05 NG/DL (ref 0.71–1.51)
TSH SERPL DL<=0.005 MIU/L-ACNC: 1.55 UIU/ML (ref 0.4–4)
WBC # BLD AUTO: 6.51 K/UL (ref 3.9–12.7)

## 2021-09-20 PROCEDURE — 80053 COMPREHEN METABOLIC PANEL: CPT | Performed by: OBSTETRICS & GYNECOLOGY

## 2021-09-20 PROCEDURE — 87624 HPV HI-RISK TYP POOLED RSLT: CPT | Performed by: OBSTETRICS & GYNECOLOGY

## 2021-09-20 PROCEDURE — 99213 OFFICE O/P EST LOW 20 MIN: CPT | Mod: PBBFAC | Performed by: OBSTETRICS & GYNECOLOGY

## 2021-09-20 PROCEDURE — 84439 ASSAY OF FREE THYROXINE: CPT | Performed by: OBSTETRICS & GYNECOLOGY

## 2021-09-20 PROCEDURE — 85025 COMPLETE CBC W/AUTO DIFF WBC: CPT | Performed by: OBSTETRICS & GYNECOLOGY

## 2021-09-20 PROCEDURE — 99213 PR OFFICE/OUTPT VISIT, EST, LEVL III, 20-29 MIN: ICD-10-PCS | Mod: 25,S$PBB,, | Performed by: OBSTETRICS & GYNECOLOGY

## 2021-09-20 PROCEDURE — 99213 OFFICE O/P EST LOW 20 MIN: CPT | Mod: 25,S$PBB,, | Performed by: OBSTETRICS & GYNECOLOGY

## 2021-09-20 PROCEDURE — 84443 ASSAY THYROID STIM HORMONE: CPT | Performed by: OBSTETRICS & GYNECOLOGY

## 2021-09-20 PROCEDURE — 99999 PR PBB SHADOW E&M-EST. PATIENT-LVL III: CPT | Mod: PBBFAC,,, | Performed by: OBSTETRICS & GYNECOLOGY

## 2021-09-20 PROCEDURE — 99999 PR PBB SHADOW E&M-EST. PATIENT-LVL III: ICD-10-PCS | Mod: PBBFAC,,, | Performed by: OBSTETRICS & GYNECOLOGY

## 2021-09-20 PROCEDURE — 88175 CYTOPATH C/V AUTO FLUID REDO: CPT | Performed by: OBSTETRICS & GYNECOLOGY

## 2021-09-20 PROCEDURE — 36415 COLL VENOUS BLD VENIPUNCTURE: CPT | Performed by: OBSTETRICS & GYNECOLOGY

## 2021-09-21 ENCOUNTER — TELEPHONE (OUTPATIENT)
Dept: OBSTETRICS AND GYNECOLOGY | Facility: CLINIC | Age: 31
End: 2021-09-21

## 2021-09-23 LAB
HPV HR 12 DNA SPEC QL NAA+PROBE: NEGATIVE
HPV16 AG SPEC QL: NEGATIVE
HPV18 DNA SPEC QL NAA+PROBE: NEGATIVE

## 2021-09-24 LAB
FINAL PATHOLOGIC DIAGNOSIS: NORMAL
Lab: NORMAL

## 2021-10-08 PROBLEM — M79.18 MYALGIA OF PELVIC FLOOR: Status: ACTIVE | Noted: 2021-10-08

## 2021-10-08 PROBLEM — M62.838 MUSCLE SPASM: Status: ACTIVE | Noted: 2021-10-08

## 2021-10-13 ENCOUNTER — CLINICAL SUPPORT (OUTPATIENT)
Dept: REHABILITATION | Facility: OTHER | Age: 31
End: 2021-10-13
Attending: OBSTETRICS & GYNECOLOGY
Payer: COMMERCIAL

## 2021-10-13 DIAGNOSIS — M79.18 MYALGIA OF PELVIC FLOOR: ICD-10-CM

## 2021-10-13 DIAGNOSIS — M62.838 MUSCLE SPASM: ICD-10-CM

## 2021-10-13 DIAGNOSIS — R10.2 PELVIC PAIN IN FEMALE: ICD-10-CM

## 2021-10-13 PROCEDURE — 97530 THERAPEUTIC ACTIVITIES: CPT

## 2021-10-13 PROCEDURE — 97161 PT EVAL LOW COMPLEX 20 MIN: CPT

## 2021-11-09 ENCOUNTER — TELEPHONE (OUTPATIENT)
Dept: OBSTETRICS AND GYNECOLOGY | Facility: CLINIC | Age: 31
End: 2021-11-09

## 2021-11-09 DIAGNOSIS — Z34.90 PREGNANCY: Primary | ICD-10-CM

## 2021-12-01 ENCOUNTER — PROCEDURE VISIT (OUTPATIENT)
Dept: OBSTETRICS AND GYNECOLOGY | Facility: CLINIC | Age: 31
End: 2021-12-01
Attending: OBSTETRICS & GYNECOLOGY
Payer: MEDICAID

## 2021-12-01 ENCOUNTER — LAB VISIT (OUTPATIENT)
Dept: LAB | Facility: OTHER | Age: 31
End: 2021-12-01
Attending: REGISTERED NURSE
Payer: COMMERCIAL

## 2021-12-01 ENCOUNTER — OFFICE VISIT (OUTPATIENT)
Dept: OBSTETRICS AND GYNECOLOGY | Facility: CLINIC | Age: 31
End: 2021-12-01
Payer: MEDICAID

## 2021-12-01 VITALS
HEIGHT: 67 IN | WEIGHT: 150.56 LBS | DIASTOLIC BLOOD PRESSURE: 80 MMHG | SYSTOLIC BLOOD PRESSURE: 126 MMHG | BODY MASS INDEX: 23.63 KG/M2

## 2021-12-01 DIAGNOSIS — N91.2 AMENORRHEA: ICD-10-CM

## 2021-12-01 DIAGNOSIS — N91.2 AMENORRHEA: Primary | ICD-10-CM

## 2021-12-01 DIAGNOSIS — Z34.90 PREGNANCY: ICD-10-CM

## 2021-12-01 LAB
ABO + RH BLD: NORMAL
B-HCG UR QL: POSITIVE
BASOPHILS # BLD AUTO: 0.04 K/UL (ref 0–0.2)
BASOPHILS NFR BLD: 0.3 % (ref 0–1.9)
BLD GP AB SCN CELLS X3 SERPL QL: NORMAL
CTP QC/QA: YES
DIFFERENTIAL METHOD: ABNORMAL
EOSINOPHIL # BLD AUTO: 0.2 K/UL (ref 0–0.5)
EOSINOPHIL NFR BLD: 1.5 % (ref 0–8)
ERYTHROCYTE [DISTWIDTH] IN BLOOD BY AUTOMATED COUNT: 13.2 % (ref 11.5–14.5)
HCT VFR BLD AUTO: 37.2 % (ref 37–48.5)
HGB BLD-MCNC: 12.4 G/DL (ref 12–16)
IMM GRANULOCYTES # BLD AUTO: 0.05 K/UL (ref 0–0.04)
IMM GRANULOCYTES NFR BLD AUTO: 0.4 % (ref 0–0.5)
LYMPHOCYTES # BLD AUTO: 2.1 K/UL (ref 1–4.8)
LYMPHOCYTES NFR BLD: 18.4 % (ref 18–48)
MCH RBC QN AUTO: 29 PG (ref 27–31)
MCHC RBC AUTO-ENTMCNC: 33.3 G/DL (ref 32–36)
MCV RBC AUTO: 87 FL (ref 82–98)
MONOCYTES # BLD AUTO: 0.7 K/UL (ref 0.3–1)
MONOCYTES NFR BLD: 6.3 % (ref 4–15)
NEUTROPHILS # BLD AUTO: 8.5 K/UL (ref 1.8–7.7)
NEUTROPHILS NFR BLD: 73.1 % (ref 38–73)
NRBC BLD-RTO: 0 /100 WBC
PLATELET # BLD AUTO: 265 K/UL (ref 150–450)
PMV BLD AUTO: 10 FL (ref 9.2–12.9)
RBC # BLD AUTO: 4.28 M/UL (ref 4–5.4)
WBC # BLD AUTO: 11.66 K/UL (ref 3.9–12.7)

## 2021-12-01 PROCEDURE — 99999 PR PBB SHADOW E&M-EST. PATIENT-LVL III: ICD-10-PCS | Mod: PBBFAC,,, | Performed by: REGISTERED NURSE

## 2021-12-01 PROCEDURE — 99213 OFFICE O/P EST LOW 20 MIN: CPT | Mod: PBBFAC,25 | Performed by: REGISTERED NURSE

## 2021-12-01 PROCEDURE — 81025 URINE PREGNANCY TEST: CPT | Mod: PBBFAC | Performed by: REGISTERED NURSE

## 2021-12-01 PROCEDURE — 76801 US OB/GYN PROCEDURE (VIEWPOINT): ICD-10-PCS | Mod: 26,S$PBB,, | Performed by: OBSTETRICS & GYNECOLOGY

## 2021-12-01 PROCEDURE — 87591 N.GONORRHOEAE DNA AMP PROB: CPT | Performed by: REGISTERED NURSE

## 2021-12-01 PROCEDURE — 87186 SC STD MICRODIL/AGAR DIL: CPT | Performed by: REGISTERED NURSE

## 2021-12-01 PROCEDURE — 86900 BLOOD TYPING SEROLOGIC ABO: CPT | Performed by: REGISTERED NURSE

## 2021-12-01 PROCEDURE — 87389 HIV-1 AG W/HIV-1&-2 AB AG IA: CPT | Performed by: REGISTERED NURSE

## 2021-12-01 PROCEDURE — 87086 URINE CULTURE/COLONY COUNT: CPT | Performed by: REGISTERED NURSE

## 2021-12-01 PROCEDURE — 86762 RUBELLA ANTIBODY: CPT | Performed by: REGISTERED NURSE

## 2021-12-01 PROCEDURE — 87088 URINE BACTERIA CULTURE: CPT | Performed by: REGISTERED NURSE

## 2021-12-01 PROCEDURE — 36415 COLL VENOUS BLD VENIPUNCTURE: CPT | Performed by: REGISTERED NURSE

## 2021-12-01 PROCEDURE — 99215 PR OFFICE/OUTPT VISIT, EST, LEVL V, 40-54 MIN: ICD-10-PCS | Mod: S$PBB,,, | Performed by: REGISTERED NURSE

## 2021-12-01 PROCEDURE — 99215 OFFICE O/P EST HI 40 MIN: CPT | Mod: S$PBB,,, | Performed by: REGISTERED NURSE

## 2021-12-01 PROCEDURE — 86592 SYPHILIS TEST NON-TREP QUAL: CPT | Performed by: REGISTERED NURSE

## 2021-12-01 PROCEDURE — 87491 CHLMYD TRACH DNA AMP PROBE: CPT | Performed by: REGISTERED NURSE

## 2021-12-01 PROCEDURE — 85025 COMPLETE CBC W/AUTO DIFF WBC: CPT | Performed by: REGISTERED NURSE

## 2021-12-01 PROCEDURE — 99999 PR PBB SHADOW E&M-EST. PATIENT-LVL III: CPT | Mod: PBBFAC,,, | Performed by: REGISTERED NURSE

## 2021-12-01 PROCEDURE — 76801 OB US < 14 WKS SINGLE FETUS: CPT | Mod: PBBFAC | Performed by: OBSTETRICS & GYNECOLOGY

## 2021-12-01 PROCEDURE — 87077 CULTURE AEROBIC IDENTIFY: CPT | Performed by: REGISTERED NURSE

## 2021-12-01 PROCEDURE — 87340 HEPATITIS B SURFACE AG IA: CPT | Performed by: REGISTERED NURSE

## 2021-12-02 LAB
HBV SURFACE AG SERPL QL IA: NEGATIVE
HIV 1+2 AB+HIV1 P24 AG SERPL QL IA: NEGATIVE
RUBV IGG SER-ACNC: 16.6 IU/ML
RUBV IGG SER-IMP: REACTIVE

## 2021-12-03 LAB
C TRACH DNA SPEC QL NAA+PROBE: NOT DETECTED
N GONORRHOEA DNA SPEC QL NAA+PROBE: NOT DETECTED
RPR SER QL: NORMAL

## 2021-12-04 DIAGNOSIS — O23.41 UTI (URINARY TRACT INFECTION) DURING PREGNANCY, FIRST TRIMESTER: Primary | ICD-10-CM

## 2021-12-04 LAB — BACTERIA UR CULT: ABNORMAL

## 2021-12-04 RX ORDER — AMOXICILLIN 500 MG/1
500 CAPSULE ORAL EVERY 8 HOURS
Qty: 15 CAPSULE | Refills: 0 | Status: SHIPPED | OUTPATIENT
Start: 2021-12-04 | End: 2021-12-09

## 2021-12-29 NOTE — TELEPHONE ENCOUNTER
Patient did a home rapid test, symptoms started Monday 12/27/21, she is going today at 10:00 to [employer] [facility] [address] and I informed her to get a copy of the test results done there.   Please send on rx for antibiotic as you discussed.pt has been notified and is agreeable  Pharmacy has been updated

## 2022-01-11 ENCOUNTER — TELEPHONE (OUTPATIENT)
Dept: OBSTETRICS AND GYNECOLOGY | Facility: CLINIC | Age: 32
End: 2022-01-11
Payer: MEDICAID

## 2022-01-11 ENCOUNTER — PATIENT MESSAGE (OUTPATIENT)
Dept: OBSTETRICS AND GYNECOLOGY | Facility: CLINIC | Age: 32
End: 2022-01-11
Payer: MEDICAID

## 2022-01-13 ENCOUNTER — INITIAL PRENATAL (OUTPATIENT)
Dept: OBSTETRICS AND GYNECOLOGY | Facility: CLINIC | Age: 32
End: 2022-01-13
Attending: OBSTETRICS & GYNECOLOGY
Payer: COMMERCIAL

## 2022-01-13 ENCOUNTER — PATIENT MESSAGE (OUTPATIENT)
Dept: MATERNAL FETAL MEDICINE | Facility: CLINIC | Age: 32
End: 2022-01-13
Payer: MEDICAID

## 2022-01-13 VITALS
WEIGHT: 156.31 LBS | SYSTOLIC BLOOD PRESSURE: 110 MMHG | DIASTOLIC BLOOD PRESSURE: 70 MMHG | BODY MASS INDEX: 24.48 KG/M2

## 2022-01-13 DIAGNOSIS — Z3A.15 15 WEEKS GESTATION OF PREGNANCY: Primary | ICD-10-CM

## 2022-01-13 PROCEDURE — 99999 PR PBB SHADOW E&M-EST. PATIENT-LVL III: CPT | Mod: PBBFAC,,, | Performed by: OBSTETRICS & GYNECOLOGY

## 2022-01-13 PROCEDURE — 0502F PR SUBSEQUENT PRENATAL CARE: ICD-10-PCS | Mod: S$GLB,,, | Performed by: OBSTETRICS & GYNECOLOGY

## 2022-01-13 PROCEDURE — 99999 PR PBB SHADOW E&M-EST. PATIENT-LVL III: ICD-10-PCS | Mod: PBBFAC,,, | Performed by: OBSTETRICS & GYNECOLOGY

## 2022-01-13 PROCEDURE — 0502F SUBSEQUENT PRENATAL CARE: CPT | Mod: S$GLB,,, | Performed by: OBSTETRICS & GYNECOLOGY

## 2022-01-13 NOTE — PROGRESS NOTES
MT 21 done, negative.  MFM scan ordered and discussed.  Connected mom ordered.  Doing well overall, discussed management of routine preg symptoms.  Precautions reviewed.  F/U 4 weeks or PRN

## 2022-01-15 ENCOUNTER — PATIENT MESSAGE (OUTPATIENT)
Dept: ADMINISTRATIVE | Facility: OTHER | Age: 32
End: 2022-01-15
Payer: MEDICAID

## 2022-02-09 ENCOUNTER — PATIENT MESSAGE (OUTPATIENT)
Dept: MATERNAL FETAL MEDICINE | Facility: CLINIC | Age: 32
End: 2022-02-09
Payer: MEDICAID

## 2022-02-10 ENCOUNTER — TELEPHONE (OUTPATIENT)
Dept: OBSTETRICS AND GYNECOLOGY | Facility: CLINIC | Age: 32
End: 2022-02-10
Payer: MEDICAID

## 2022-02-10 ENCOUNTER — PROCEDURE VISIT (OUTPATIENT)
Dept: MATERNAL FETAL MEDICINE | Facility: CLINIC | Age: 32
End: 2022-02-10
Attending: OBSTETRICS & GYNECOLOGY
Payer: MEDICAID

## 2022-02-10 DIAGNOSIS — Z36.4 ANTENATAL SCREENING FOR FETAL GROWTH RETARDATION USING ULTRASONICS: ICD-10-CM

## 2022-02-10 DIAGNOSIS — Z36.3 ANTENATAL SCREENING FOR MALFORMATION USING ULTRASONICS: ICD-10-CM

## 2022-02-10 DIAGNOSIS — Z3A.15 15 WEEKS GESTATION OF PREGNANCY: ICD-10-CM

## 2022-02-10 DIAGNOSIS — Z3A.19 19 WEEKS GESTATION OF PREGNANCY: ICD-10-CM

## 2022-02-10 PROCEDURE — 76805 OB US >/= 14 WKS SNGL FETUS: CPT | Mod: PBBFAC | Performed by: OBSTETRICS & GYNECOLOGY

## 2022-02-10 PROCEDURE — 76805 PR US, OB 14+WKS, TRANSABD, SINGLE GESTATION: ICD-10-PCS | Mod: 26,S$PBB,, | Performed by: OBSTETRICS & GYNECOLOGY

## 2022-02-10 PROCEDURE — 76805 OB US >/= 14 WKS SNGL FETUS: CPT | Mod: 26,S$PBB,, | Performed by: OBSTETRICS & GYNECOLOGY

## 2022-02-10 NOTE — TELEPHONE ENCOUNTER
----- Message from Maximo Durand sent at 2/10/2022 12:20 PM CST -----  Name of Who is Calling: DYLLAN CRESPO          What is the request in detail: The patient is calling to speak to the nurse in regards to genetic testing. Please advise          Can the clinic reply by [facility]: Yes         [facility][phone]

## 2022-02-15 ENCOUNTER — PATIENT MESSAGE (OUTPATIENT)
Dept: OBSTETRICS AND GYNECOLOGY | Facility: CLINIC | Age: 32
End: 2022-02-15
Payer: MEDICAID

## 2022-02-15 ENCOUNTER — TELEPHONE (OUTPATIENT)
Dept: OBSTETRICS AND GYNECOLOGY | Facility: CLINIC | Age: 32
End: 2022-02-15
Payer: MEDICAID

## 2022-02-15 NOTE — TELEPHONE ENCOUNTER
MT21 negative for trisomy 13, 18, 21.  Sex chromosomes normal.  [employer] message sent, sex not revealed.

## 2022-02-16 ENCOUNTER — ROUTINE PRENATAL (OUTPATIENT)
Dept: OBSTETRICS AND GYNECOLOGY | Facility: CLINIC | Age: 32
End: 2022-02-16
Attending: OBSTETRICS & GYNECOLOGY
Payer: COMMERCIAL

## 2022-02-16 VITALS — DIASTOLIC BLOOD PRESSURE: 60 MMHG | SYSTOLIC BLOOD PRESSURE: 100 MMHG | BODY MASS INDEX: 25 KG/M2 | WEIGHT: 159.63 LBS

## 2022-02-16 DIAGNOSIS — Z3A.20 20 WEEKS GESTATION OF PREGNANCY: Primary | ICD-10-CM

## 2022-02-16 DIAGNOSIS — Z34.90 PREGNANCY WITH ONE FETUS, ANTEPARTUM: ICD-10-CM

## 2022-02-16 DIAGNOSIS — R82.998 LEUKOCYTES IN URINE: ICD-10-CM

## 2022-02-16 DIAGNOSIS — Z87.898 HISTORY OF SYNCOPE: ICD-10-CM

## 2022-02-16 PROCEDURE — 99999 PR PBB SHADOW E&M-EST. PATIENT-LVL II: ICD-10-PCS | Mod: PBBFAC,,, | Performed by: OBSTETRICS & GYNECOLOGY

## 2022-02-16 PROCEDURE — 99999 PR PBB SHADOW E&M-EST. PATIENT-LVL II: CPT | Mod: PBBFAC,,, | Performed by: OBSTETRICS & GYNECOLOGY

## 2022-02-16 PROCEDURE — 0502F SUBSEQUENT PRENATAL CARE: CPT | Mod: S$GLB,,, | Performed by: OBSTETRICS & GYNECOLOGY

## 2022-02-16 PROCEDURE — 0502F PR SUBSEQUENT PRENATAL CARE: ICD-10-PCS | Mod: S$GLB,,, | Performed by: OBSTETRICS & GYNECOLOGY

## 2022-02-16 NOTE — PROGRESS NOTES
Recent near syncopal episode.  No irregular heart beats. Has been doing better since stopping caffeine.  Discussed glucose screen with next visit.  All precautions reviewed and anatomy scan reviewed.  F/U 4 weeks or PRN

## 2022-02-16 NOTE — PROGRESS NOTES
Blood +++    Urine culture order #[id number] was collected in the system after urine was discarded on patient. Specimen unable to be collected. Request was made with Rehana Myles to remove lab order. Verbalized understanding, will cancel.

## 2022-03-07 ENCOUNTER — TELEPHONE (OUTPATIENT)
Dept: OBSTETRICS AND GYNECOLOGY | Facility: CLINIC | Age: 32
End: 2022-03-07
Payer: MEDICAID

## 2022-03-07 NOTE — TELEPHONE ENCOUNTER
----- Message from Isabela Shine sent at 3/7/2022  9:53 AM CST -----  Pt is calling to speak to nurse  Pt would like to know if an antibiotic is safe to use during pregnancy for a UTI that she received from an urgent care   Pt can be contacted at [phone]

## 2022-03-09 ENCOUNTER — PATIENT MESSAGE (OUTPATIENT)
Dept: OBSTETRICS AND GYNECOLOGY | Facility: CLINIC | Age: 32
End: 2022-03-09
Payer: MEDICAID

## 2022-03-09 DIAGNOSIS — N39.0 URINARY TRACT INFECTION WITHOUT HEMATURIA, SITE UNSPECIFIED: Primary | ICD-10-CM

## 2022-03-10 NOTE — TELEPHONE ENCOUNTER
Started on macrobid for UTI.  Reports some low abdominal pain, low, right sided.  Fairly consistent, does have a history of kidney stones.    Also history of   congestion, headache.  No fever.  Urged patient to get flu swab in the mean time.  Denies CVA tenderness.  Will come in tomorrow, drop U.culture at lab on way up.

## 2022-03-11 ENCOUNTER — ROUTINE PRENATAL (OUTPATIENT)
Dept: OBSTETRICS AND GYNECOLOGY | Facility: CLINIC | Age: 32
End: 2022-03-11
Attending: OBSTETRICS & GYNECOLOGY
Payer: COMMERCIAL

## 2022-03-11 ENCOUNTER — LAB VISIT (OUTPATIENT)
Dept: LAB | Facility: OTHER | Age: 32
End: 2022-03-11
Attending: OBSTETRICS & GYNECOLOGY
Payer: COMMERCIAL

## 2022-03-11 DIAGNOSIS — Z34.90 PREGNANCY WITH ONE FETUS, ANTEPARTUM: Primary | ICD-10-CM

## 2022-03-11 DIAGNOSIS — N39.0 URINARY TRACT INFECTION WITHOUT HEMATURIA, SITE UNSPECIFIED: ICD-10-CM

## 2022-03-11 PROCEDURE — 99999 PR PBB SHADOW E&M-EST. PATIENT-LVL II: CPT | Mod: PBBFAC,,, | Performed by: OBSTETRICS & GYNECOLOGY

## 2022-03-11 PROCEDURE — 0502F SUBSEQUENT PRENATAL CARE: CPT | Mod: S$GLB,,, | Performed by: OBSTETRICS & GYNECOLOGY

## 2022-03-11 PROCEDURE — 87086 URINE CULTURE/COLONY COUNT: CPT | Performed by: OBSTETRICS & GYNECOLOGY

## 2022-03-11 PROCEDURE — 0502F PR SUBSEQUENT PRENATAL CARE: ICD-10-PCS | Mod: S$GLB,,, | Performed by: OBSTETRICS & GYNECOLOGY

## 2022-03-11 PROCEDURE — 99999 PR PBB SHADOW E&M-EST. PATIENT-LVL II: ICD-10-PCS | Mod: PBBFAC,,, | Performed by: OBSTETRICS & GYNECOLOGY

## 2022-03-11 NOTE — PROGRESS NOTES
Urine culture dropped at lab. Per prior note, recent bad reaction to macrobid.  Feels better from that perspective. Ruled out flu and covid.  Discussed pain and evaluated.  Seems consistent with round ligament rather than nephrolithiasis.  No concern for appy.  Precautions and support recs given.  F/U PRN or as scheduled

## 2022-03-12 LAB — BACTERIA UR CULT: NORMAL

## 2022-03-14 ENCOUNTER — PATIENT MESSAGE (OUTPATIENT)
Dept: OBSTETRICS AND GYNECOLOGY | Facility: CLINIC | Age: 32
End: 2022-03-14
Payer: MEDICAID

## 2022-03-16 ENCOUNTER — LAB VISIT (OUTPATIENT)
Dept: LAB | Facility: OTHER | Age: 32
End: 2022-03-16
Attending: OBSTETRICS & GYNECOLOGY
Payer: MEDICAID

## 2022-03-16 ENCOUNTER — ROUTINE PRENATAL (OUTPATIENT)
Dept: OBSTETRICS AND GYNECOLOGY | Facility: CLINIC | Age: 32
End: 2022-03-16
Attending: OBSTETRICS & GYNECOLOGY
Payer: COMMERCIAL

## 2022-03-16 VITALS
WEIGHT: 162.25 LBS | SYSTOLIC BLOOD PRESSURE: 120 MMHG | DIASTOLIC BLOOD PRESSURE: 60 MMHG | BODY MASS INDEX: 25.41 KG/M2

## 2022-03-16 DIAGNOSIS — Z34.90 PREGNANCY WITH ONE FETUS, ANTEPARTUM: Primary | ICD-10-CM

## 2022-03-16 DIAGNOSIS — Z3A.20 20 WEEKS GESTATION OF PREGNANCY: ICD-10-CM

## 2022-03-16 LAB
BASOPHILS # BLD AUTO: 0.03 K/UL (ref 0–0.2)
BASOPHILS NFR BLD: 0.3 % (ref 0–1.9)
DIFFERENTIAL METHOD: ABNORMAL
EOSINOPHIL # BLD AUTO: 0.2 K/UL (ref 0–0.5)
EOSINOPHIL NFR BLD: 1.3 % (ref 0–8)
ERYTHROCYTE [DISTWIDTH] IN BLOOD BY AUTOMATED COUNT: 13.2 % (ref 11.5–14.5)
GLUCOSE SERPL-MCNC: 149 MG/DL (ref 70–140)
HCT VFR BLD AUTO: 33.9 % (ref 37–48.5)
HGB BLD-MCNC: 11.2 G/DL (ref 12–16)
IMM GRANULOCYTES # BLD AUTO: 0.23 K/UL (ref 0–0.04)
IMM GRANULOCYTES NFR BLD AUTO: 2 % (ref 0–0.5)
LYMPHOCYTES # BLD AUTO: 1.9 K/UL (ref 1–4.8)
LYMPHOCYTES NFR BLD: 16.1 % (ref 18–48)
MCH RBC QN AUTO: 29.6 PG (ref 27–31)
MCHC RBC AUTO-ENTMCNC: 33 G/DL (ref 32–36)
MCV RBC AUTO: 90 FL (ref 82–98)
MONOCYTES # BLD AUTO: 0.7 K/UL (ref 0.3–1)
MONOCYTES NFR BLD: 6.1 % (ref 4–15)
NEUTROPHILS # BLD AUTO: 8.6 K/UL (ref 1.8–7.7)
NEUTROPHILS NFR BLD: 74.2 % (ref 38–73)
NRBC BLD-RTO: 0 /100 WBC
PLATELET # BLD AUTO: 249 K/UL (ref 150–450)
PMV BLD AUTO: 10 FL (ref 9.2–12.9)
RBC # BLD AUTO: 3.78 M/UL (ref 4–5.4)
WBC # BLD AUTO: 11.57 K/UL (ref 3.9–12.7)

## 2022-03-16 PROCEDURE — 99999 PR PBB SHADOW E&M-EST. PATIENT-LVL II: CPT | Mod: PBBFAC,,, | Performed by: OBSTETRICS & GYNECOLOGY

## 2022-03-16 PROCEDURE — 0502F SUBSEQUENT PRENATAL CARE: CPT | Mod: S$GLB,,, | Performed by: OBSTETRICS & GYNECOLOGY

## 2022-03-16 PROCEDURE — 85025 COMPLETE CBC W/AUTO DIFF WBC: CPT | Performed by: OBSTETRICS & GYNECOLOGY

## 2022-03-16 PROCEDURE — 99999 PR PBB SHADOW E&M-EST. PATIENT-LVL II: ICD-10-PCS | Mod: PBBFAC,,, | Performed by: OBSTETRICS & GYNECOLOGY

## 2022-03-16 PROCEDURE — 82950 GLUCOSE TEST: CPT | Performed by: OBSTETRICS & GYNECOLOGY

## 2022-03-16 PROCEDURE — 0502F PR SUBSEQUENT PRENATAL CARE: ICD-10-PCS | Mod: S$GLB,,, | Performed by: OBSTETRICS & GYNECOLOGY

## 2022-03-16 PROCEDURE — 36415 COLL VENOUS BLD VENIPUNCTURE: CPT | Performed by: OBSTETRICS & GYNECOLOGY

## 2022-03-16 NOTE — PROGRESS NOTES
Patient seen and examined.  No complaints, denies VB/LOF/Ctxns,  Heartburn, discussed pepcid AC BID as needed.  reports good fetal movement. Precautions for Bleeding/ ROM/ Decreased fetal movement/ Pre-E reviewed.  F/U scheduled 4 weeks

## 2022-03-18 ENCOUNTER — PATIENT MESSAGE (OUTPATIENT)
Dept: OBSTETRICS AND GYNECOLOGY | Facility: CLINIC | Age: 32
End: 2022-03-18
Payer: MEDICAID

## 2022-03-18 DIAGNOSIS — O99.810 ABNORMAL GLUCOSE TOLERANCE IN PREGNANCY: Primary | ICD-10-CM

## 2022-03-23 ENCOUNTER — PATIENT MESSAGE (OUTPATIENT)
Dept: ADMINISTRATIVE | Facility: OTHER | Age: 32
End: 2022-03-23
Payer: MEDICAID

## 2022-03-24 ENCOUNTER — LAB VISIT (OUTPATIENT)
Dept: LAB | Facility: HOSPITAL | Age: 32
End: 2022-03-24
Attending: OBSTETRICS & GYNECOLOGY
Payer: MEDICAID

## 2022-03-24 DIAGNOSIS — O99.810 ABNORMAL GLUCOSE TOLERANCE IN PREGNANCY: ICD-10-CM

## 2022-03-24 LAB
GLUCOSE SERPL-MCNC: 102 MG/DL
GLUCOSE SERPL-MCNC: 110 MG/DL
GLUCOSE SERPL-MCNC: 130 MG/DL
GLUCOSE SERPL-MCNC: 82 MG/DL (ref 70–110)

## 2022-03-24 PROCEDURE — 82951 GLUCOSE TOLERANCE TEST (GTT): CPT | Performed by: OBSTETRICS & GYNECOLOGY

## 2022-03-24 PROCEDURE — 36415 COLL VENOUS BLD VENIPUNCTURE: CPT | Performed by: OBSTETRICS & GYNECOLOGY

## 2022-03-31 ENCOUNTER — PATIENT MESSAGE (OUTPATIENT)
Dept: OBSTETRICS AND GYNECOLOGY | Facility: CLINIC | Age: 32
End: 2022-03-31
Payer: MEDICAID

## 2022-04-13 ENCOUNTER — PATIENT MESSAGE (OUTPATIENT)
Dept: ADMINISTRATIVE | Facility: OTHER | Age: 32
End: 2022-04-13
Payer: MEDICAID

## 2022-04-19 ENCOUNTER — ROUTINE PRENATAL (OUTPATIENT)
Dept: OBSTETRICS AND GYNECOLOGY | Facility: CLINIC | Age: 32
End: 2022-04-19
Attending: OBSTETRICS & GYNECOLOGY
Payer: COMMERCIAL

## 2022-04-19 VITALS
WEIGHT: 169.56 LBS | SYSTOLIC BLOOD PRESSURE: 116 MMHG | BODY MASS INDEX: 26.55 KG/M2 | DIASTOLIC BLOOD PRESSURE: 70 MMHG

## 2022-04-19 DIAGNOSIS — O99.810 ABNORMAL GLUCOSE TOLERANCE IN PREGNANCY: Primary | ICD-10-CM

## 2022-04-19 DIAGNOSIS — Z34.90 PREGNANCY WITH ONE FETUS, ANTEPARTUM: ICD-10-CM

## 2022-04-19 PROCEDURE — 99999 PR PBB SHADOW E&M-EST. PATIENT-LVL II: CPT | Mod: PBBFAC,,, | Performed by: OBSTETRICS & GYNECOLOGY

## 2022-04-19 PROCEDURE — 99999 PR PBB SHADOW E&M-EST. PATIENT-LVL II: ICD-10-PCS | Mod: PBBFAC,,, | Performed by: OBSTETRICS & GYNECOLOGY

## 2022-04-19 PROCEDURE — 0502F SUBSEQUENT PRENATAL CARE: CPT | Mod: S$GLB,,, | Performed by: OBSTETRICS & GYNECOLOGY

## 2022-04-19 PROCEDURE — 0502F PR SUBSEQUENT PRENATAL CARE: ICD-10-PCS | Mod: S$GLB,,, | Performed by: OBSTETRICS & GYNECOLOGY

## 2022-04-19 NOTE — PROGRESS NOTES
Patient seen and examined.  No complaints, denies VB/LOF/Ctxns, reports good fetal movement. Precautions for Bleeding/ ROM/ Decreased fetal movement/ Pre-E reviewed.  Passed 3 hour, may do growth in later T3.  tdap with next visit.  Reviewed kick counts and discussed breast pump.  F/U scheduled 2 weeks

## 2022-05-10 ENCOUNTER — ROUTINE PRENATAL (OUTPATIENT)
Dept: OBSTETRICS AND GYNECOLOGY | Facility: CLINIC | Age: 32
End: 2022-05-10
Attending: OBSTETRICS & GYNECOLOGY
Payer: COMMERCIAL

## 2022-05-10 VITALS
BODY MASS INDEX: 26.76 KG/M2 | SYSTOLIC BLOOD PRESSURE: 116 MMHG | WEIGHT: 170.88 LBS | DIASTOLIC BLOOD PRESSURE: 68 MMHG

## 2022-05-10 DIAGNOSIS — Z34.90 PREGNANCY WITH ONE FETUS, ANTEPARTUM: Primary | ICD-10-CM

## 2022-05-10 PROCEDURE — 0502F PR SUBSEQUENT PRENATAL CARE: ICD-10-PCS | Mod: S$GLB,,, | Performed by: OBSTETRICS & GYNECOLOGY

## 2022-05-10 PROCEDURE — 99999 PR PBB SHADOW E&M-EST. PATIENT-LVL II: CPT | Mod: PBBFAC,,, | Performed by: OBSTETRICS & GYNECOLOGY

## 2022-05-10 PROCEDURE — 99999 PR PBB SHADOW E&M-EST. PATIENT-LVL II: ICD-10-PCS | Mod: PBBFAC,,, | Performed by: OBSTETRICS & GYNECOLOGY

## 2022-05-10 PROCEDURE — 0502F SUBSEQUENT PRENATAL CARE: CPT | Mod: S$GLB,,, | Performed by: OBSTETRICS & GYNECOLOGY

## 2022-05-10 NOTE — PROGRESS NOTES
Patient seen and examined.  Some tightening and pelvic pressure, better with support belt.  Reviewed lifting restrictions. denies VB/LOF/Ctxns, reports good fetal movement. Precautions for Bleeding/ ROM/ Decreased fetal movement/ Pre-E reviewed.  F/U scheduled 2 weeks

## 2022-05-11 ENCOUNTER — PATIENT MESSAGE (OUTPATIENT)
Dept: ADMINISTRATIVE | Facility: OTHER | Age: 32
End: 2022-05-11
Payer: MEDICAID

## 2022-05-24 ENCOUNTER — ROUTINE PRENATAL (OUTPATIENT)
Dept: OBSTETRICS AND GYNECOLOGY | Facility: CLINIC | Age: 32
End: 2022-05-24
Attending: OBSTETRICS & GYNECOLOGY
Payer: COMMERCIAL

## 2022-05-24 VITALS
WEIGHT: 171.94 LBS | BODY MASS INDEX: 26.93 KG/M2 | SYSTOLIC BLOOD PRESSURE: 120 MMHG | DIASTOLIC BLOOD PRESSURE: 82 MMHG

## 2022-05-24 DIAGNOSIS — Z34.90 PREGNANCY WITH ONE FETUS, ANTEPARTUM: Primary | ICD-10-CM

## 2022-05-24 PROCEDURE — 0502F PR SUBSEQUENT PRENATAL CARE: ICD-10-PCS | Mod: S$GLB,,, | Performed by: OBSTETRICS & GYNECOLOGY

## 2022-05-24 PROCEDURE — 99999 PR PBB SHADOW E&M-EST. PATIENT-LVL II: CPT | Mod: PBBFAC,,, | Performed by: OBSTETRICS & GYNECOLOGY

## 2022-05-24 PROCEDURE — 99999 PR PBB SHADOW E&M-EST. PATIENT-LVL II: ICD-10-PCS | Mod: PBBFAC,,, | Performed by: OBSTETRICS & GYNECOLOGY

## 2022-05-24 PROCEDURE — 0502F SUBSEQUENT PRENATAL CARE: CPT | Mod: S$GLB,,, | Performed by: OBSTETRICS & GYNECOLOGY

## 2022-05-24 RX ORDER — PANTOPRAZOLE SODIUM 40 MG/1
40 TABLET, DELAYED RELEASE ORAL DAILY
Qty: 30 TABLET | Refills: 1 | Status: SHIPPED | OUTPATIENT
Start: 2022-05-24 | End: 2022-08-26

## 2022-05-24 NOTE — PROGRESS NOTES
Appropriate FH.  Has ordered breast pump, discussed normal changes in quality of movement.  Would like delivery early July, will consider.  Unsure on contraception.  pepcid with minimal relief, protonix sent to pharmacy.  F/U 1 week or PRN

## 2022-05-31 ENCOUNTER — ROUTINE PRENATAL (OUTPATIENT)
Dept: OBSTETRICS AND GYNECOLOGY | Facility: CLINIC | Age: 32
End: 2022-05-31
Attending: OBSTETRICS & GYNECOLOGY
Payer: MEDICAID

## 2022-05-31 VITALS
WEIGHT: 171.94 LBS | DIASTOLIC BLOOD PRESSURE: 82 MMHG | BODY MASS INDEX: 26.93 KG/M2 | SYSTOLIC BLOOD PRESSURE: 122 MMHG

## 2022-05-31 DIAGNOSIS — Z34.90 PREGNANCY WITH ONE FETUS, ANTEPARTUM: Primary | ICD-10-CM

## 2022-05-31 PROCEDURE — 99213 OFFICE O/P EST LOW 20 MIN: CPT | Mod: TH,S$PBB,, | Performed by: OBSTETRICS & GYNECOLOGY

## 2022-05-31 PROCEDURE — 99999 PR PBB SHADOW E&M-EST. PATIENT-LVL II: CPT | Mod: PBBFAC,,, | Performed by: OBSTETRICS & GYNECOLOGY

## 2022-05-31 PROCEDURE — 99213 PR OFFICE/OUTPT VISIT, EST, LEVL III, 20-29 MIN: ICD-10-PCS | Mod: TH,S$PBB,, | Performed by: OBSTETRICS & GYNECOLOGY

## 2022-05-31 PROCEDURE — 99999 PR PBB SHADOW E&M-EST. PATIENT-LVL II: ICD-10-PCS | Mod: PBBFAC,,, | Performed by: OBSTETRICS & GYNECOLOGY

## 2022-06-11 ENCOUNTER — HOSPITAL ENCOUNTER (EMERGENCY)
Facility: OTHER | Age: 32
Discharge: HOME | End: 2022-06-11
Attending: OBSTETRICS & GYNECOLOGY
Payer: MEDICAID

## 2022-06-11 ENCOUNTER — NURSE TRIAGE (OUTPATIENT)
Dept: ADMINISTRATIVE | Facility: CLINIC | Age: 32
End: 2022-06-11
Payer: MEDICAID

## 2022-06-11 VITALS
TEMPERATURE: 98 F | OXYGEN SATURATION: 96 % | SYSTOLIC BLOOD PRESSURE: 111 MMHG | HEART RATE: 99 BPM | DIASTOLIC BLOOD PRESSURE: 69 MMHG

## 2022-06-11 DIAGNOSIS — Z3A.36 36 WEEKS GESTATION OF PREGNANCY: Primary | ICD-10-CM

## 2022-06-11 PROCEDURE — 99284 EMERGENCY DEPT VISIT MOD MDM: CPT | Mod: 25,,, | Performed by: OBSTETRICS & GYNECOLOGY

## 2022-06-11 PROCEDURE — 99284 EMERGENCY DEPT VISIT MOD MDM: CPT | Mod: 25

## 2022-06-11 PROCEDURE — 59025 FETAL NON-STRESS TEST: CPT | Mod: 26,,, | Performed by: OBSTETRICS & GYNECOLOGY

## 2022-06-11 PROCEDURE — 59025 FETAL NON-STRESS TEST: CPT

## 2022-06-11 PROCEDURE — 99284 PR EMERGENCY DEPT VISIT,LEVEL IV: ICD-10-PCS | Mod: 25,,, | Performed by: OBSTETRICS & GYNECOLOGY

## 2022-06-11 PROCEDURE — 59025 PR FETAL 2N-STRESS TEST: ICD-10-PCS | Mod: 26,,, | Performed by: OBSTETRICS & GYNECOLOGY

## 2022-06-11 NOTE — ED PROVIDER NOTES
Encounter Date: 2022    Louann Kapoor is a 32 y.o. [id number] at 36w3d presents complaining of possible gush of fluid ~ 12 hours ago. Pt denies continued leakage. She reports she was nervous/anxious over the possibility of being ruptured and denies any other symptoms, so that is why it took her longer to come in. Continues to deny any further leakage of fluid. Denies fever, chills, dysuria, hematuria, abdominal pain. Reports occasional pelvic pressure.      This IUP is complicated by h/o syncope with blood draws, myalgia of pelvic floor.  Patient denies contractions, denies vaginal bleeding, reports LOF.   Fetal Movement: normal.        History     Chief Complaint   Patient presents with    Rupture of Membranes     HPI  Review of patient's allergies indicates:  No Known Allergies  No past medical history on file.  Past Surgical History:   Procedure Laterality Date    DILATION AND CURETTAGE OF UTERUS  12/10/2020     Family History   Problem Relation Age of Onset    Breast cancer Neg Hx     Colon cancer Neg Hx     Diabetes Neg Hx     Hypertension Neg Hx     Eclampsia Neg Hx     Stroke Neg Hx     Miscarriages / Stillbirths Neg Hx     Ovarian cancer Neg Hx      labor Neg Hx      Social History     Tobacco Use    Smoking status: Never Smoker    Smokeless tobacco: Never Used   Substance Use Topics    Alcohol use: No    Drug use: No     Review of Systems   Constitutional: Negative for chills, fever and unexpected weight change.   HENT: Negative for sinus pressure and sinus pain.    Respiratory: Negative for cough and shortness of breath.    Cardiovascular: Negative for chest pain and palpitations.   Gastrointestinal: Negative for abdominal distention, abdominal pain, constipation and diarrhea.   Endocrine: Negative for cold intolerance and heat intolerance.   Genitourinary: Positive for vaginal discharge. Negative for difficulty urinating, dyspareunia, dysuria, genital sores, pelvic pain,  urgency and vaginal pain.        Vag discharge=LOF   Musculoskeletal: Negative for back pain.   Neurological: Negative for dizziness, syncope and weakness.   Hematological: Does not bruise/bleed easily.       Physical Exam     Initial Vitals   BP Pulse Resp Temp SpO2   06/11/22 1054 06/11/22 1053 -- 06/11/22 1054 06/11/22 1054   132/85 78  98.2 °F (36.8 °C) 98 %      MAP       --                Physical Exam    Constitutional: She appears well-developed and well-nourished. No distress.   HENT:   Head: Normocephalic and atraumatic.   Eyes: Conjunctivae are normal.   Neck:   Normal range of motion.  Cardiovascular: Normal rate.   Pulmonary/Chest: No respiratory distress.   Abdominal: There is no abdominal tenderness.   gravid There is no rebound and no guarding.   Genitourinary:    Vagina normal.     Musculoskeletal:         General: No edema. Normal range of motion.      Cervical back: Normal range of motion.     Neurological: She is alert and oriented to person, place, and time.   Skin: Skin is warm and dry.   Psychiatric: She has a normal mood and affect. Thought content normal.     OB LABOR EXAM:     Membranes ruptured: No.     Vaginal Bleeding: none present.     Dilatation: 0.   Station: -4.     Amniotic Fluid Color: no fluid.     Comments: SSE: negative pooling, negative nitrazine, negative ferning       ED Course   Obtain Fetal nonstress test (NST)    Date/Time: 6/11/2022 11:37 AM  Performed by: Ghazal Collins MD  Authorized by: Ghazal Collins MD     Nonstress Test:     Variability:  6-25 BPM    Decelerations:  None    Accelerations:  15 bpm    Acoustic Stimulator: No      Baseline:  140    Uterine Irritability: Yes      Contractions:  Irregular  Biophysical Profile:     Nonstress Test Interpretation: reactive      Overall Impression:  Reassuring  Post-procedure:     Patient tolerance:  Patient tolerated the procedure well with no immediate complications      Labs Reviewed - No data to display       Imaging  Results    None          Medications - No data to display  Medical Decision Making:   ED Management:  VSS and WNL  NST RR  TOCO with irregular ctx  SSE: negative ROM  SVE: closed/thick/hi  Pt counseled on labor precautions  All questions answered  Pt agrees with plan   Other:   I discussed test(s) with the performing physician.            Attending Attestation:   Physician Attestation Statement for Resident:  As the supervising MD   Physician Attestation Statement: I have personally seen and examined this patient.   I agree with the above history. -:   As the supervising MD I agree with the above PE.    As the supervising MD I agree with the above treatment, course, plan, and disposition.   -:   NST  I independently reviewed the fetal non-stress test with the following interpretation:  140 BPM baseline  Variability: moderate  Accelerations: present  Decelerations: absent  Contractions: none  Category 1    Clinical Interpretation:reactive    Patient evaluated and found to be stable, agree with resident's assessment and plan.  Cat 1 tracing. Ruled out for ROM. Labor precautions reviewed. Reassurance provided.    I was personally present during the critical portions of the procedure(s) performed by the resident and was immediately available in the ED to provide services and assistance as needed during the entire procedure.                       Ghazal Collins MD  OBGYN PGY-1    Clinical Impression:   Final diagnoses:  [Z3A.36] 36 weeks gestation of pregnancy (Primary)          ED Disposition Condition    Discharge Stable        ED Prescriptions     None        Follow-up Information    None          Ghazal Collins MD  Resident  06/11/22 [id number]       Josey Burgess MD  06/11/22 [id number]

## 2022-06-11 NOTE — TELEPHONE ENCOUNTER
"Caller states she start 36.5 week pregnant and is leaking fluid, caller denies abd pain or contractions at this time.  Pt advised per protocol and verbalized understanding.     Reason for Disposition   Leakage of fluid from vagina    Additional Information   Negative: [1] SEVERE abdominal pain (e.g., excruciating) AND [2] constant AND [3] present > 1 hour   Negative: Severe bleeding (e.g., continuous red blood from vagina, or large blood clots)   Negative: Umbilical cord hanging out of the vagina (shiny, white, curled appearance, "like telephone cord")   Negative: Uncontrollable urge to push (i.e., feels like baby is coming out now)   Negative: Can see baby   Negative: Sounds like a life-threatening emergency to the triager   Negative: Vaginal bleeding    Protocols used: PREGNANCY - RUPTURE OF [id number]      "

## 2022-06-11 NOTE — DISCHARGE INSTRUCTIONS
Please return to Labor and Delivery if you experience any leaking of fluid (like your water bag broke), vaginal bleeding (soaking more than 1 pad per hour), decreased fetal movement, and/or having contractions every 2-5 minutes for 2 hours.     Labor  and Delivery: [phone]

## 2022-06-14 ENCOUNTER — ANESTHESIA (OUTPATIENT)
Dept: OBSTETRICS AND GYNECOLOGY | Facility: OTHER | Age: 32
End: 2022-06-14
Payer: MEDICAID

## 2022-06-14 ENCOUNTER — ANESTHESIA EVENT (OUTPATIENT)
Dept: OBSTETRICS AND GYNECOLOGY | Facility: OTHER | Age: 32
End: 2022-06-14
Payer: MEDICAID

## 2022-06-14 ENCOUNTER — ROUTINE PRENATAL (OUTPATIENT)
Dept: OBSTETRICS AND GYNECOLOGY | Facility: CLINIC | Age: 32
End: 2022-06-14
Attending: OBSTETRICS & GYNECOLOGY
Payer: COMMERCIAL

## 2022-06-14 ENCOUNTER — HOSPITAL ENCOUNTER (INPATIENT)
Facility: OTHER | Age: 32
LOS: 3 days | Discharge: HOME | End: 2022-06-17
Attending: OBSTETRICS & GYNECOLOGY | Admitting: OBSTETRICS & GYNECOLOGY
Payer: MEDICAID

## 2022-06-14 VITALS
BODY MASS INDEX: 27.45 KG/M2 | DIASTOLIC BLOOD PRESSURE: 84 MMHG | SYSTOLIC BLOOD PRESSURE: 126 MMHG | WEIGHT: 175.25 LBS

## 2022-06-14 DIAGNOSIS — O42.90 PROM (PREMATURE RUPTURE OF MEMBRANES): ICD-10-CM

## 2022-06-14 LAB
ABO + RH BLD: NORMAL
ALBUMIN SERPL BCP-MCNC: 2.7 G/DL (ref 3.5–5.2)
ALP SERPL-CCNC: 134 U/L (ref 55–135)
ALT SERPL W/O P-5'-P-CCNC: 10 U/L (ref 10–44)
ANION GAP SERPL CALC-SCNC: 16 MMOL/L (ref 8–16)
AST SERPL-CCNC: 27 U/L (ref 10–40)
BASOPHILS # BLD AUTO: 0.03 K/UL (ref 0–0.2)
BASOPHILS NFR BLD: 0.3 % (ref 0–1.9)
BILIRUB SERPL-MCNC: 0.3 MG/DL (ref 0.1–1)
BLD GP AB SCN CELLS X3 SERPL QL: NORMAL
BUN SERPL-MCNC: 7 MG/DL (ref 6–20)
CALCIUM SERPL-MCNC: 9.2 MG/DL (ref 8.7–10.5)
CHLORIDE SERPL-SCNC: 106 MMOL/L (ref 95–110)
CO2 SERPL-SCNC: 16 MMOL/L (ref 23–29)
CREAT SERPL-MCNC: 0.7 MG/DL (ref 0.5–1.4)
DIFFERENTIAL METHOD: ABNORMAL
EOSINOPHIL # BLD AUTO: 0.1 K/UL (ref 0–0.5)
EOSINOPHIL NFR BLD: 0.7 % (ref 0–8)
ERYTHROCYTE [DISTWIDTH] IN BLOOD BY AUTOMATED COUNT: 13.2 % (ref 11.5–14.5)
EST. GFR  (AFRICAN AMERICAN): >60 ML/MIN/1.73 M^2
EST. GFR  (NON AFRICAN AMERICAN): >60 ML/MIN/1.73 M^2
GLUCOSE SERPL-MCNC: 71 MG/DL (ref 70–110)
HCT VFR BLD AUTO: 31.9 % (ref 37–48.5)
HGB BLD-MCNC: 10.3 G/DL (ref 12–16)
HIV 1+2 AB+HIV1 P24 AG SERPL QL IA: NEGATIVE
IMM GRANULOCYTES # BLD AUTO: 0.22 K/UL (ref 0–0.04)
IMM GRANULOCYTES NFR BLD AUTO: 2.1 % (ref 0–0.5)
LYMPHOCYTES # BLD AUTO: 2 K/UL (ref 1–4.8)
LYMPHOCYTES NFR BLD: 18.6 % (ref 18–48)
MCH RBC QN AUTO: 27.2 PG (ref 27–31)
MCHC RBC AUTO-ENTMCNC: 32.3 G/DL (ref 32–36)
MCV RBC AUTO: 84 FL (ref 82–98)
MONOCYTES # BLD AUTO: 0.8 K/UL (ref 0.3–1)
MONOCYTES NFR BLD: 7.8 % (ref 4–15)
NEUTROPHILS # BLD AUTO: 7.6 K/UL (ref 1.8–7.7)
NEUTROPHILS NFR BLD: 70.5 % (ref 38–73)
NRBC BLD-RTO: 0 /100 WBC
PLATELET # BLD AUTO: 323 K/UL (ref 150–450)
PMV BLD AUTO: 10 FL (ref 9.2–12.9)
POTASSIUM SERPL-SCNC: 4.5 MMOL/L (ref 3.5–5.1)
PROT SERPL-MCNC: 7.3 G/DL (ref 6–8.4)
RBC # BLD AUTO: 3.78 M/UL (ref 4–5.4)
SARS-COV-2 RDRP RESP QL NAA+PROBE: NEGATIVE
SODIUM SERPL-SCNC: 138 MMOL/L (ref 136–145)
WBC # BLD AUTO: 10.7 K/UL (ref 3.9–12.7)

## 2022-06-14 PROCEDURE — 87389 HIV-1 AG W/HIV-1&-2 AB AG IA: CPT

## 2022-06-14 PROCEDURE — 99999 PR PBB SHADOW E&M-EST. PATIENT-LVL II: ICD-10-PCS | Mod: PBBFAC,,, | Performed by: OBSTETRICS & GYNECOLOGY

## 2022-06-14 PROCEDURE — 11000001 HC ACUTE MED/SURG PRIVATE ROOM

## 2022-06-14 PROCEDURE — 86901 BLOOD TYPING SEROLOGIC RH(D): CPT

## 2022-06-14 PROCEDURE — U0002 COVID-19 LAB TEST NON-CDC: HCPCS | Performed by: OBSTETRICS & GYNECOLOGY

## 2022-06-14 PROCEDURE — 63600175 PHARM REV CODE 636 W HCPCS

## 2022-06-14 PROCEDURE — 25000003 PHARM REV CODE 250

## 2022-06-14 PROCEDURE — 87340 HEPATITIS B SURFACE AG IA: CPT

## 2022-06-14 PROCEDURE — 72100002 HC LABOR CARE, 1ST 8 HOURS

## 2022-06-14 PROCEDURE — 87081 CULTURE SCREEN ONLY: CPT | Performed by: OBSTETRICS & GYNECOLOGY

## 2022-06-14 PROCEDURE — 0502F PR SUBSEQUENT PRENATAL CARE: ICD-10-PCS | Mod: S$GLB,,, | Performed by: OBSTETRICS & GYNECOLOGY

## 2022-06-14 PROCEDURE — 85025 COMPLETE CBC W/AUTO DIFF WBC: CPT

## 2022-06-14 PROCEDURE — 72100003 HC LABOR CARE, EA. ADDL. 8 HRS: Performed by: OBSTETRICS & GYNECOLOGY

## 2022-06-14 PROCEDURE — 80053 COMPREHEN METABOLIC PANEL: CPT

## 2022-06-14 PROCEDURE — 86592 SYPHILIS TEST NON-TREP QUAL: CPT

## 2022-06-14 PROCEDURE — 99999 PR PBB SHADOW E&M-EST. PATIENT-LVL II: CPT | Mod: PBBFAC,,, | Performed by: OBSTETRICS & GYNECOLOGY

## 2022-06-14 PROCEDURE — 0502F SUBSEQUENT PRENATAL CARE: CPT | Mod: S$GLB,,, | Performed by: OBSTETRICS & GYNECOLOGY

## 2022-06-14 RX ORDER — CARBOPROST TROMETHAMINE 250 UG/ML
250 INJECTION, SOLUTION INTRAMUSCULAR
Status: DISCONTINUED | OUTPATIENT
Start: 2022-06-14 | End: 2022-06-15

## 2022-06-14 RX ORDER — LIDOCAINE HYDROCHLORIDE 10 MG/ML
10 INJECTION INFILTRATION; PERINEURAL ONCE AS NEEDED
Status: DISCONTINUED | OUTPATIENT
Start: 2022-06-14 | End: 2022-06-15

## 2022-06-14 RX ORDER — SODIUM CHLORIDE 9 MG/ML
INJECTION, SOLUTION INTRAVENOUS
Status: DISCONTINUED | OUTPATIENT
Start: 2022-06-14 | End: 2022-06-15

## 2022-06-14 RX ORDER — MISOPROSTOL 200 UG/1
800 TABLET ORAL
Status: DISCONTINUED | OUTPATIENT
Start: 2022-06-14 | End: 2022-06-15

## 2022-06-14 RX ORDER — OXYTOCIN/RINGER'S LACTATE 30/500 ML
334 PLASTIC BAG, INJECTION (ML) INTRAVENOUS ONCE
Status: DISCONTINUED | OUTPATIENT
Start: 2022-06-14 | End: 2022-06-15

## 2022-06-14 RX ORDER — DIPHENOXYLATE HYDROCHLORIDE AND ATROPINE SULFATE 2.5; .025 MG/1; MG/1
1 TABLET ORAL 4 TIMES DAILY PRN
Status: DISCONTINUED | OUTPATIENT
Start: 2022-06-14 | End: 2022-06-15

## 2022-06-14 RX ORDER — SIMETHICONE 80 MG
1 TABLET,CHEWABLE ORAL 4 TIMES DAILY PRN
Status: DISCONTINUED | OUTPATIENT
Start: 2022-06-14 | End: 2022-06-15

## 2022-06-14 RX ORDER — CEFAZOLIN SODIUM 2 G/50ML
2 SOLUTION INTRAVENOUS ONCE AS NEEDED
Status: DISCONTINUED | OUTPATIENT
Start: 2022-06-14 | End: 2022-06-15

## 2022-06-14 RX ORDER — METHYLERGONOVINE MALEATE 0.2 MG/ML
200 INJECTION INTRAVENOUS
Status: DISCONTINUED | OUTPATIENT
Start: 2022-06-14 | End: 2022-06-15

## 2022-06-14 RX ORDER — CALCIUM CARBONATE 200(500)MG
500 TABLET,CHEWABLE ORAL 3 TIMES DAILY PRN
Status: DISCONTINUED | OUTPATIENT
Start: 2022-06-14 | End: 2022-06-15

## 2022-06-14 RX ORDER — PROCHLORPERAZINE EDISYLATE 5 MG/ML
5 INJECTION INTRAMUSCULAR; INTRAVENOUS EVERY 6 HOURS PRN
Status: DISCONTINUED | OUTPATIENT
Start: 2022-06-14 | End: 2022-06-15

## 2022-06-14 RX ORDER — OXYTOCIN/RINGER'S LACTATE 30/500 ML
95 PLASTIC BAG, INJECTION (ML) INTRAVENOUS ONCE
Status: DISCONTINUED | OUTPATIENT
Start: 2022-06-14 | End: 2022-06-15

## 2022-06-14 RX ORDER — SODIUM CHLORIDE, SODIUM LACTATE, POTASSIUM CHLORIDE, CALCIUM CHLORIDE 600; 310; 30; 20 MG/100ML; MG/100ML; MG/100ML; MG/100ML
INJECTION, SOLUTION INTRAVENOUS CONTINUOUS
Status: DISCONTINUED | OUTPATIENT
Start: 2022-06-14 | End: 2022-06-15

## 2022-06-14 RX ORDER — ONDANSETRON 8 MG/1
8 TABLET, ORALLY DISINTEGRATING ORAL EVERY 8 HOURS PRN
Status: DISCONTINUED | OUTPATIENT
Start: 2022-06-14 | End: 2022-06-15

## 2022-06-14 RX ORDER — TRANEXAMIC ACID 100 MG/ML
1000 INJECTION, SOLUTION INTRAVENOUS ONCE AS NEEDED
Status: DISCONTINUED | OUTPATIENT
Start: 2022-06-14 | End: 2022-06-15

## 2022-06-14 RX ADMIN — DEXTROSE 5 MILLION UNITS: 50 INJECTION, SOLUTION INTRAVENOUS at 04:06

## 2022-06-14 RX ADMIN — DEXTROSE 3 MILLION UNITS: 50 INJECTION, SOLUTION INTRAVENOUS at 08:06

## 2022-06-14 RX ADMIN — SODIUM CHLORIDE, SODIUM LACTATE, POTASSIUM CHLORIDE, AND CALCIUM CHLORIDE: .6; .31; .03; .02 INJECTION, SOLUTION INTRAVENOUS at 03:06

## 2022-06-14 NOTE — PROGRESS NOTES
Patient seen Sunday after large gush of fluid on Saturday night.    Ruled out for rupture at OB ED at that time.  Has continued to have large amount of clear, fluid discharge since that time.  Reports good fetal movement, denies contractions, bleeding, fever.  Bedside sono with multiple pockets of fluid noted.  Speculum exam performed with immediate, large volume of pooling of clear fluid.  Cephalic presentation confirmed on ultrasound.  Discussed need to move to delivery.  Induction indicated as > 48h without spontaneous labor given risk of infection for both patient and baby.  L&D informed.  Pitocin as induction agent.

## 2022-06-14 NOTE — PROGRESS NOTES
"LABOR NOTE    S:  Complaints: No.  Epidural working:  not applicable      O: Ht 5' 7" (1.702 m)   Wt 79.5 kg (175 lb 4.3 oz)   LMP 10/01/2021   Breastfeeding No   BMI 27.45 kg/m²       FHT: 140 Cat 1 (reassuring)  CTX: q 3-4 minutes  SVE: 3/70/-2      ASSESSMENT:   32 y.o.  at 36w6d, IOL     FHT reassuring    Active Hospital Problems    Diagnosis  POA    PROM (premature rupture of membranes) [O42.90]  Yes      Resolved Hospital Problems   No resolved problems to display.     Course:    - PROM clr    - IOL   1530: 3/70/-2      PLAN:  Patient declines Pitocin at this time, discussed increased risk of NICU admission and CS in setting of prolonged ROM. Discussed starting at next check if unchanged.   Continue Close Maternal/Fetal Monitoring  Pitocin Augmentation per protocol  Recheck 2 hours or PRN    Josefina Clemente MD  PGY 1  Obstetrics and Gynecology      Josefina Clemente MD  PGY 1  Obstetrics and Gynecology  "

## 2022-06-14 NOTE — H&P
HISTORY AND PHYSICAL                                                OBSTETRICS          Subjective:       Louann Kapoor is a 32 y.o.  female with IUP at 36w6d weeks gestation who presents with PPROM.    Patient had LOF on , evaluated in JUNIOR however had negative ROM. Presented to clinic today with Dr. Montana, suspected ROM due to gross pooling. Patient endorses intermittent leakage since , unsure when she exactly ruptured. Vertex in clinic.     Patient denies contractions, denies vaginal bleeding, reports LOF.   Fetal Movement: normal.    This IUP is complicated by PPROM.    Review of Systems   Constitutional: Negative for activity change, appetite change, chills and fever.   Respiratory: Negative for cough and shortness of breath.    Cardiovascular: Negative for chest pain.   Gastrointestinal: Negative for abdominal pain, constipation, diarrhea, nausea and vomiting.   Genitourinary: Positive for vaginal discharge. Negative for vaginal bleeding.   Musculoskeletal: Negative for back pain.   Neurological: Negative for numbness and headaches.   Psychiatric/Behavioral: Negative for depression. The patient is not nervous/anxious.        PMHx: No past medical history on file.    PSHx:   Past Surgical History:   Procedure Laterality Date    DILATION AND CURETTAGE OF UTERUS  12/10/2020       All: Review of patient's allergies indicates:  No Known Allergies    Meds:   Medications Prior to Admission   Medication Sig Dispense Refill Last Dose    famotidine-calcium carbonate-magnesium hydroxide (PEPCID COMPLETE) chewable tablet Take 1 tablet by mouth daily as needed.       pantoprazole (PROTONIX) 40 MG tablet Take 1 tablet (40 mg total) by mouth once daily. 30 tablet 1     PRENATAL 25/IRON/FOLATE 6/DHA (PRENA1 ORAL) Take by mouth.          SH:   Social History     Socioeconomic History    Marital status:    Tobacco Use    Smoking status: Never Smoker    Smokeless tobacco: Never Used   Substance  and Sexual Activity    Alcohol use: No    Drug use: No    Sexual activity: Yes     Partners: Male       FH:   Family History   Problem Relation Age of Onset    Breast cancer Neg Hx     Colon cancer Neg Hx     Diabetes Neg Hx     Hypertension Neg Hx     Eclampsia Neg Hx     Stroke Neg Hx     Miscarriages / Stillbirths Neg Hx     Ovarian cancer Neg Hx      labor Neg Hx        OBHx:   OB History    Para Term  AB Living   3 1 1 0 1 1   SAB IAB Ectopic Multiple Live Births   1 0 0 0 1      # Outcome Date GA Lbr Blue/2nd Weight Sex Delivery Anes PTL Lv   3 Current            2 SAB 20     SAB      1 Term 18 39w0d  3.657 kg (8 lb 1 oz) F Vag-Spont None N JOSSIE      Name: KARTIK CRESPO      Apgar1: 8  Apgar5: 9       Objective:       LMP 10/01/2021     There were no vitals filed for this visit.    General:   alert, appears stated age and cooperative, no apparent distress   HENT:  normocephalic, atraumatic   Eyes:  extraocular movements and conjunctivae normal   Neck:  supple, range of motion normal, no thyromegaly   Lungs:   no respiratory distress   Heart:   regular rate   Abdomen:  soft, non-tender, non-distended but gravid, no rebound or guarding    Extremities negative edema, negative erythema   FHT: 140, moderate BTBV, +accels, -decels;  Cat 1 (reassuring)                 TOCO: Q 3-4 minutes   Presentations: cephalic by ultrasound   Cervix:     Dilation: 3    Effacement: 75%    Station:  -2    Consistency: medium    Position: middle     EFW by Leopold's: 7lb     Recent Growth Scan: none     Lab Review  Blood Type B POS  GBBS: unknown   Rubella: Immune  RPR: NR  HIV: negative  HepB: negative       Assessment:       36w6d weeks gestation with PPROM     Active Hospital Problems    Diagnosis  POA    PROM (premature rupture of membranes) [O42.90]  Yes      Resolved Hospital Problems   No resolved problems to display.          Plan:   1. IOL    Risks, benefits, alternatives  and possible complications have been discussed in detail with the patient.   - Consents signed and to chart  - Admit to Labor and Delivery unit   - Epidural per Anesthesia  - Draw CBC, T&S  - Notify Staff  - Recheck in 4 hrs or PRN  - Maternal pelvis adequate   - PCN for GBS unknown     Post-Partum Hemorrhage risk - low       Josefina Clemente MD  PGY 1  Obstetrics and Gynecology

## 2022-06-14 NOTE — ANESTHESIA PREPROCEDURE EVALUATION
[facility]  Anesthesia Pre-Operative Evaluation         Patient Name: Louann Kapoor  YOB: 1990  MRN: [id number]    2022      Louann Kapoor is a 32 y.o. female  @ 36w6d who presents for IOL. Patient is not on blood thinners and denies any back surgeries.  She had a previous  without an epidural.     OB History    Para Term  AB Living   3 1 1   1 1   SAB IAB Ectopic Multiple Live Births   1     0 1      # Outcome Date GA Lbr Blue/2nd Weight Sex Delivery Anes PTL Lv   3 Current            2 SAB 20     SAB      1 Term 18 39w0d  3.657 kg (8 lb 1 oz) F Vag-Spont None N JOSSIE       Review of patient's allergies indicates:  No Known Allergies    Wt Readings from Last 1 Encounters:   22 1139 79.5 kg (175 lb 4.3 oz)       BP Readings from Last 3 Encounters:   22 126/84   22 111/69   22 122/82       Patient Active Problem List   Diagnosis    Myalgia of pelvic floor    Muscle spasm    Pregnancy with one fetus, antepartum    History of syncope with blood draws     premature rupture of membranes (PPROM) with onset of labor after 24 hours of rupture in third trimester, antepartum    PROM (premature rupture of membranes)       Past Surgical History:   Procedure Laterality Date    DILATION AND CURETTAGE OF UTERUS  12/10/2020       Social History     Socioeconomic History    Marital status:    Tobacco Use    Smoking status: Never Smoker    Smokeless tobacco: Never Used   Substance and Sexual Activity    Alcohol use: No    Drug use: No    Sexual activity: Yes     Partners: Male         Chemistry        Component Value Date/Time     2021 1149    K 4.2 2021 1149     2021 1149    CO2 23 2021 1149    BUN 10 2021 1149    CREATININE 0.9 2021 1149    GLU 90 2021 1149        Component Value Date/Time    CALCIUM 9.9 2021  1149    ALKPHOS 58 09/20/2021 1149    AST 17 09/20/2021 1149    ALT 8 (L) 09/20/2021 1149    BILITOT 0.7 09/20/2021 1149    ESTGFRAFRICA >60 09/20/2021 1149    EGFRNONAA >60 09/20/2021 1149            Lab Results   Component Value Date    WBC 11.57 03/16/2022    HGB 11.2 (L) 03/16/2022    HCT 33.9 (L) 03/16/2022    MCV 90 03/16/2022     03/16/2022       No results for input(s): PT, INR, PROTIME, APTT in the last 72 hours.          Pre-op Assessment    I have reviewed the Patient Summary Reports.     I have reviewed the Nursing Notes.    I have reviewed the Medications.     Review of Systems  Anesthesia Hx:  Denies Hx of Anesthetic complications  Denies Family Hx of Anesthesia complications.   Denies Personal Hx of Anesthesia complications.   Hematology/Oncology:  Hematology Normal   Oncology Normal     EENT/Dental:EENT/Dental Normal   Cardiovascular:  Cardiovascular Normal     Pulmonary:  Pulmonary Normal    Renal/:  Renal/ Normal     Hepatic/GI:  Hepatic/GI Normal    Musculoskeletal:  Musculoskeletal Normal    Neurological:  Neurology Normal    Endocrine:  Endocrine Normal    Dermatological:  Skin Normal    Psych:  Psychiatric Normal           Physical Exam  General: Well nourished, Cooperative and Alert    Airway:  Mallampati: I   Mouth Opening: Normal  TM Distance: Normal  Neck ROM: Normal ROM    Dental:  Intact    Chest/Lungs:  Normal Respiratory Rate    Heart:  Rate: Normal        Anesthesia Plan  Type of Anesthesia, risks & benefits discussed:    Anesthesia Type: Epidural, Gen ETT, CSE, Spinal  Intra-op Monitoring Plan: Standard ASA Monitors  Post Op Pain Control Plan: multimodal analgesia, IV/PO Opioids PRN and epidural analgesia  Informed Consent: Informed consent signed with the Patient and all parties understand the risks and agree with anesthesia plan.  All questions answered.   ASA Score: 2  Day of Surgery Review of History & Physical: H&P Update referred to the surgeon/provider.    Ready For  Surgery From Anesthesia Perspective.     .

## 2022-06-14 NOTE — ED NOTES
36w6d by THI   COVID-19 pend    Dx: PPROM    PPHRisk: low  SVE: 3/70/-2 @  1530 Mem: SRROB clr @    US: vtx    Tenzin:   Meds: GOGO BC: GERALD      Labs:B POS/RI/HIVneg/RPRNR/GBBSunk      [ ] CBC, T&S  [ ] OB - Band   [no] PP Lovenox [ ] declines Pit

## 2022-06-15 LAB
HBV SURFACE AG SERPL QL IA: NEGATIVE
RPR SER QL: NORMAL

## 2022-06-15 PROCEDURE — 59514 CESAREAN DELIVERY ONLY: CPT | Mod: AT,,, | Performed by: OBSTETRICS & GYNECOLOGY

## 2022-06-15 PROCEDURE — 11000001 HC ACUTE MED/SURG PRIVATE ROOM

## 2022-06-15 PROCEDURE — 25000003 PHARM REV CODE 250

## 2022-06-15 PROCEDURE — 88307 PR  SURG PATH,LEVEL V: ICD-10-PCS | Mod: 26,,, | Performed by: PATHOLOGY

## 2022-06-15 PROCEDURE — 88307 TISSUE EXAM BY PATHOLOGIST: CPT | Performed by: PATHOLOGY

## 2022-06-15 PROCEDURE — 63600175 PHARM REV CODE 636 W HCPCS

## 2022-06-15 PROCEDURE — 88307 TISSUE EXAM BY PATHOLOGIST: CPT | Mod: 26,,, | Performed by: PATHOLOGY

## 2022-06-15 PROCEDURE — 25000003 PHARM REV CODE 250: Performed by: OBSTETRICS & GYNECOLOGY

## 2022-06-15 PROCEDURE — 59514 PR CESAREAN DELIVERY ONLY: ICD-10-PCS | Mod: AT,,, | Performed by: OBSTETRICS & GYNECOLOGY

## 2022-06-15 PROCEDURE — 72100003 HC LABOR CARE, EA. ADDL. 8 HRS

## 2022-06-15 PROCEDURE — 72200005 HC VAGINAL DELIVERY LEVEL II: Performed by: OBSTETRICS & GYNECOLOGY

## 2022-06-15 RX ORDER — OXYTOCIN/RINGER'S LACTATE 30/500 ML
95 PLASTIC BAG, INJECTION (ML) INTRAVENOUS ONCE
Status: COMPLETED | OUTPATIENT
Start: 2022-06-15 | End: 2022-06-15

## 2022-06-15 RX ORDER — OXYCODONE AND ACETAMINOPHEN 10; 325 MG/1; MG/1
1 TABLET ORAL EVERY 4 HOURS PRN
Status: DISCONTINUED | OUTPATIENT
Start: 2022-06-15 | End: 2022-06-17 | Stop reason: HOSPADM

## 2022-06-15 RX ORDER — ONDANSETRON 8 MG/1
8 TABLET, ORALLY DISINTEGRATING ORAL EVERY 8 HOURS PRN
Status: DISCONTINUED | OUTPATIENT
Start: 2022-06-15 | End: 2022-06-17 | Stop reason: HOSPADM

## 2022-06-15 RX ORDER — PROCHLORPERAZINE EDISYLATE 5 MG/ML
5 INJECTION INTRAMUSCULAR; INTRAVENOUS EVERY 6 HOURS PRN
Status: DISCONTINUED | OUTPATIENT
Start: 2022-06-15 | End: 2022-06-17 | Stop reason: HOSPADM

## 2022-06-15 RX ORDER — IBUPROFEN 600 MG/1
600 TABLET ORAL EVERY 6 HOURS
Status: DISCONTINUED | OUTPATIENT
Start: 2022-06-15 | End: 2022-06-17 | Stop reason: HOSPADM

## 2022-06-15 RX ORDER — SODIUM CHLORIDE 0.9 % (FLUSH) 0.9 %
10 SYRINGE (ML) INJECTION
Status: DISCONTINUED | OUTPATIENT
Start: 2022-06-15 | End: 2022-06-17 | Stop reason: HOSPADM

## 2022-06-15 RX ORDER — DOCUSATE SODIUM 100 MG/1
200 CAPSULE, LIQUID FILLED ORAL 2 TIMES DAILY PRN
Status: DISCONTINUED | OUTPATIENT
Start: 2022-06-15 | End: 2022-06-17 | Stop reason: HOSPADM

## 2022-06-15 RX ORDER — OXYCODONE AND ACETAMINOPHEN 5; 325 MG/1; MG/1
1 TABLET ORAL EVERY 4 HOURS PRN
Status: DISCONTINUED | OUTPATIENT
Start: 2022-06-15 | End: 2022-06-17 | Stop reason: HOSPADM

## 2022-06-15 RX ORDER — SIMETHICONE 80 MG
1 TABLET,CHEWABLE ORAL EVERY 6 HOURS PRN
Status: DISCONTINUED | OUTPATIENT
Start: 2022-06-15 | End: 2022-06-17 | Stop reason: HOSPADM

## 2022-06-15 RX ORDER — ACETAMINOPHEN 325 MG/1
650 TABLET ORAL EVERY 6 HOURS PRN
Status: DISCONTINUED | OUTPATIENT
Start: 2022-06-15 | End: 2022-06-17 | Stop reason: HOSPADM

## 2022-06-15 RX ORDER — DIPHENHYDRAMINE HCL 25 MG
25 CAPSULE ORAL EVERY 4 HOURS PRN
Status: DISCONTINUED | OUTPATIENT
Start: 2022-06-15 | End: 2022-06-17 | Stop reason: HOSPADM

## 2022-06-15 RX ORDER — HYDROCORTISONE 25 MG/G
CREAM TOPICAL 3 TIMES DAILY PRN
Status: DISCONTINUED | OUTPATIENT
Start: 2022-06-15 | End: 2022-06-17 | Stop reason: HOSPADM

## 2022-06-15 RX ORDER — PRENATAL WITH FERROUS FUM AND FOLIC ACID 3080; 920; 120; 400; 22; 1.84; 3; 20; 10; 1; 12; 200; 27; 25; 2 [IU]/1; [IU]/1; MG/1; [IU]/1; MG/1; MG/1; MG/1; MG/1; MG/1; MG/1; UG/1; MG/1; MG/1; MG/1; MG/1
1 TABLET ORAL DAILY
Status: DISCONTINUED | OUTPATIENT
Start: 2022-06-15 | End: 2022-06-17 | Stop reason: HOSPADM

## 2022-06-15 RX ORDER — DIPHENHYDRAMINE HYDROCHLORIDE 50 MG/ML
25 INJECTION INTRAMUSCULAR; INTRAVENOUS EVERY 4 HOURS PRN
Status: DISCONTINUED | OUTPATIENT
Start: 2022-06-15 | End: 2022-06-17 | Stop reason: HOSPADM

## 2022-06-15 RX ORDER — IBUPROFEN 600 MG/1
600 TABLET ORAL EVERY 6 HOURS
Status: DISCONTINUED | OUTPATIENT
Start: 2022-06-15 | End: 2022-06-15

## 2022-06-15 RX ORDER — OXYTOCIN/RINGER'S LACTATE 30/500 ML
0-30 PLASTIC BAG, INJECTION (ML) INTRAVENOUS CONTINUOUS
Status: DISCONTINUED | OUTPATIENT
Start: 2022-06-15 | End: 2022-06-15

## 2022-06-15 RX ADMIN — DEXTROSE 3 MILLION UNITS: 50 INJECTION, SOLUTION INTRAVENOUS at 12:06

## 2022-06-15 RX ADMIN — DEXTROSE 3 MILLION UNITS: 50 INJECTION, SOLUTION INTRAVENOUS at 09:06

## 2022-06-15 RX ADMIN — IBUPROFEN 600 MG: 600 TABLET ORAL at 09:06

## 2022-06-15 RX ADMIN — DOCUSATE SODIUM 200 MG: 100 CAPSULE, LIQUID FILLED ORAL at 09:06

## 2022-06-15 RX ADMIN — DEXTROSE 3 MILLION UNITS: 50 INJECTION, SOLUTION INTRAVENOUS at 05:06

## 2022-06-15 RX ADMIN — Medication 95 MILLI-UNITS/MIN: at 02:06

## 2022-06-15 RX ADMIN — IBUPROFEN 600 MG: 600 TABLET ORAL at 03:06

## 2022-06-15 RX ADMIN — SODIUM CHLORIDE, SODIUM LACTATE, POTASSIUM CHLORIDE, AND CALCIUM CHLORIDE: .6; .31; .03; .02 INJECTION, SOLUTION INTRAVENOUS at 05:06

## 2022-06-15 NOTE — PROGRESS NOTES
"S: 32 y.o.  at 37w0d currently admitted for management of PROM, now feeling contractions more intensely, forebag ruptured previously.  Remains afebrile.    O:   Temp:  [98.1 °F (36.7 °C)-98.9 °F (37.2 °C)] 98.5 °F (36.9 °C)  Pulse:  [] 89  Resp:  [14-17] 16  SpO2:  [96 %-99 %] 99 %  BP: (117-138)/(63-89) 131/63  /63   Pulse 89   Temp 98.5 °F (36.9 °C) (Oral)   Resp 16   Ht 5' 7" (1.702 m)   Wt 79.5 kg (175 lb 4.3 oz)   LMP 10/01/2021   SpO2 99%   Breastfeeding No   BMI 27.45 kg/m²     FHT: 150, reactive, cat I  Rock/IUPC: q 4 minutes  SVE: 6/90/-2, clear fluid noted    ASSESSMENT: 37w0d   Patient Active Problem List   Diagnosis    Myalgia of pelvic floor    Muscle spasm    Pregnancy with one fetus, antepartum    History of syncope with blood draws     premature rupture of membranes (PPROM) with onset of labor after 24 hours of rupture in third trimester, antepartum    PROM (premature rupture of membranes)       PLAN:  - Continue Close Maternal/Fetal Monitoring  - Recheck 2 hours or PRN  - Pitocin Augmentation per protocol if no change in 2 hours, currently seems to have transitioned to active phase of labor, therefore will need active management if no change with next check especially given length of time of ROM    Johanna Montana,   [phone]      "

## 2022-06-15 NOTE — PROGRESS NOTES
"LABOR NOTE    S:  Complaints: No.  Epidural working:  not applicable      MD to bedside for cervical check     O: /87   Pulse 85   Temp 98.6 °F (37 °C)   Resp 17   Ht 5' 7" (1.702 m)   Wt 79.5 kg (175 lb 4.3 oz)   LMP 10/01/2021   SpO2 97%   Breastfeeding No   BMI 27.45 kg/m²     FHT: baseline 120, moderate BTBV, + accels, no decels category 1 (reassuring)  CTX: q 3-4 minutes  SVE: 5/80/-2    Timeline:   6/11 - PROM clr    1530: 3/70/-2    1830: 4/70/-2   2230: 5/80/-2  0230: 5/80/-2    PLAN:  Continue Close Maternal/Fetal Monitoring  Discussed recommendation of pitocin augmentation given lack of cervical change in 4 hours.  Discussed increased risk of infection given prolonged ROM and still not in active labor. Patient declines at this time.  Recheck 2-4 hours or PRN    Lourdes Romero MD  PGY-1 OB/GYN          "

## 2022-06-15 NOTE — PROGRESS NOTES
"LABOR NOTE    S:  Complaints: No.  Epidural working:  not applicable      MD to bedside for cervical check     O: /85   Pulse 96   Temp 98.5 °F (36.9 °C)   Resp 16   Ht 5' 7" (1.702 m)   Wt 79.5 kg (175 lb 4.3 oz)   LMP 10/01/2021   SpO2 98%   Breastfeeding No   BMI 27.45 kg/m²     FHT: baseline 140, Cat 1 +accels, no decels, moderate variability (reassuring)  CTX: q 3-4 minutes  SVE: 5/80/-3    Timeline:   6/11 - PROM clr    1530: 3/70/-2    1830: 4/70/-2   2230: 5/80/-2    PLAN:  If unchanged at next check, will start pitocin augmentation   Continue Close Maternal/Fetal Monitoring  Recheck 4 hours or PRN      Anabel Mckenzie MD   PGY-2, OB-GYN          "

## 2022-06-15 NOTE — L&D DELIVERY NOTE
[facility] - Labor & Delivery  Vaginal Delivery   Operative Note    SUMMARY     Normal spontaneous vaginal delivery of live infant, was placed on mothers abdomen for skin to skin and bulb suctioning performed.  Infant delivered position OA over intact perineum.  Nuchal cord: No.    Spontaneous delivery of placenta and IV pitocin given noting good uterine tone.  No lacerations noted. Placenta was noted to have small abruption, sent to pathology.     Patient tolerated delivery well. Sponge needle and lap counted correctly x2.    Indications:  (spontaneous vaginal delivery)  Pregnancy complicated by:   Patient Active Problem List   Diagnosis    Myalgia of pelvic floor    Muscle spasm    Pregnancy with one fetus, antepartum    History of syncope with blood draws     premature rupture of membranes (PPROM) with onset of labor after 24 hours of rupture in third trimester, antepartum    PROM (premature rupture of membranes)     (spontaneous vaginal delivery)     Admitting GA: 37w0d    Delivery Information for Hayder Kapoor    Birth information:  YOB: 2024   Time of birth: 1:17 PM   Sex: male   Head Delivery Date/Time:     Delivery type:    Gestational Age: 37w0d    Delivery Providers    Delivering clinician:            Measurements    Weight:   Length:          Apgars    Living status:   Apgars:  1 min.:  5 min.:  10 min.:  15 min.:  20 min.:    Skin color:         Heart rate:         Reflex irritability:         Muscle tone:         Respiratory effort:         Total:                                Interventions/Resuscitation         Cord    No data filed       Placenta    Placenta delivery date/time:   Placenta removal:            Labor Events:       labor:       Labor Onset Date/Time:         Dilation Complete Date/Time:         Start Pushing Date/Time:         Start Pushing Date/Time:       Rupture Date/Time: 22  0000         Rupture type:          Fluid Amount:        Fluid Color: Clear      Fluid Odor:       Membrane Status: SRM (Spontaneous Rupture)               steroids:       Antibiotics given for GBS:       Induction:       Indications for induction:        Augmentation:       Indications for augmentation:       Labor complications:       Additional complications:          Cervical ripening:                     Delivery:      Episiotomy:       Indication for Episiotomy:       Perineal Lacerations:   Repaired:      Periurethral Laceration:   Repaired:     Labial Laceration:   Repaired:     Sulcus Laceration:   Repaired:     Vaginal Laceration:   Repaired:     Cervical Laceration:   Repaired:     Repair suture:       Repair # of packets:       Last Value - EBL - Nursing (mL):       Sum - EBL - Nursing (mL): 0     Last Value - EBL - Anesthesia (mL):      Calculated QBL (mL):       Vaginal Sweep Performed:       S[facility]icount Correct:         Other providers:            Details (if applicable):  Trial of Labor      Categorization:      Priority:     Indications for :     Incision Type:       Additional  information:  Forceps:    Vacuum:    Breech:    Observed anomalies    Other (Comments):           Josefina Clemente MD  PGY 1  Obstetrics and Gynecology

## 2022-06-15 NOTE — PROGRESS NOTES
"LABOR NOTE    S:  Complaints: No.  Epidural working:  not applicable  MD to bedside for cervical check     O: /78   Pulse 78   Temp 98.1 °F (36.7 °C) (Oral)   Resp 16   Ht 5' 7" (1.702 m)   Wt 79.5 kg (175 lb 4.3 oz)   LMP 10/01/2021   SpO2 98%   Breastfeeding No   BMI 27.45 kg/m²     FHT: baseline 145, moderate BTBV, + accels, no decels category 1 (reassuring)  CTX: q 5 minutes  SVE: 5/80/-2, AROM forebag clear     Timeline:   6/11 - PROM clr    1530: 3/70/-2    1830: 4/70/-2   2230: 5/80/-2  0230: 5/80/-2  0500: 5/80/-2, forebag present   1015: 5/80/-2, AROM forebag clear     PLAN:  Continue Close Maternal/Fetal Monitoring  Patient still declining pitocin augmentation at this time. States she does not want to deliver before mother arrives in 20 min, discussed this is unlikely given still 5cm. We also discussed that given her prolonged rupture time, we recommend labor augmentation with pitocin, however, pt continues to decline. We discussed increased risk of chorioamnionitis and infection with prolonged ROM and stalled labor given >6 hours without cervical change. Pt expressed understanding. She is amenable to another 2 hour recheck and voiced understanding that we would continue to recommend augmentation at next check.       Blanca Hernández MD  OBGYN, PGY-1                "

## 2022-06-15 NOTE — PROGRESS NOTES
POSTPARTUM PROGRESS NOTE    Subjective:     PPD/POD#: 1   Procedure:    EGA: 37w0d   N/V: No   F/C: No   Abd Pain: Mild, well-controlled with oral pain medication   Lochia: Mild   Voiding: Yes   Ambulating: Yes   Bowel fnc: Yes   Breastfeeding: Yes   Contraception: Per primary OB   Circumcision: Consented.  Needs to be examined by OB attending.     Objective:      Temp:  [98.1 °F (36.7 °C)-98.9 °F (37.2 °C)] 98.5 °F (36.9 °C)  Pulse:  [] 89  Resp:  [14-17] 16  SpO2:  [96 %-99 %] 99 %  BP: (117-138)/(63-89) 123/69    Lung: Normal respiratory effort   Abdomen: Soft, appropriately tender   Uterus: Firm, no fundal tenderness   Incision: N/A   : Deferred   Extremities: Bilateral trace edema     Lab Review    Recent Labs   Lab 22  2035      K 4.5      CO2 16*   BUN 7   CREATININE 0.7   GLU 71   PROT 7.3   BILITOT 0.3   ALKPHOS 134   ALT 10   AST 27       Recent Labs   Lab 22  1554   WBC 10.70   HGB 10.3*   HCT 31.9*   MCV 84            I/O    Intake/Output Summary (Last 24 hours) at 6/15/2022 1617  Last data filed at 6/15/2022 1517  Gross per 24 hour   Intake 2603.84 ml   Output 405 ml   Net 2198.84 ml        Assessment and Plan:   Postpartum care:  - Patient doing well.  - Continue routine management and advances.    Blanca Hernández MD  OBGYN, PGY-1

## 2022-06-15 NOTE — PLAN OF CARE
Pt aao, vss, IV restarted due to occlusion. Plan of care discussed, question answered, and understanding verbalzied

## 2022-06-15 NOTE — PROGRESS NOTES
"LABOR NOTE    S:  Complaints: No.  Epidural working:  not applicable      MD to bedside for cervical check     O: /81   Pulse 98   Temp 98.4 °F (36.9 °C)   Resp 16   Ht 5' 7" (1.702 m)   Wt 79.5 kg (175 lb 4.3 oz)   LMP 10/01/2021   SpO2 97%   Breastfeeding No   BMI 27.45 kg/m²     FHT: baseline 120, moderate BTBV, + accels, no decels category 1 (reassuring)  CTX: q 3-4 minutes  SVE: 5/80/-2, forebag palpated, pt declined AROM     Timeline:   6/11 - PROM clr    1530: 3/70/-2    1830: 4/70/-2   2230: 5/80/-2  0230: 5/80/-2  0500: 5/80/-2, forebag present     PLAN:  Continue Close Maternal/Fetal Monitoring  Patient still declining augmentation at this time. I offered to ruptured palpated forebag on exam now that fetal head in well applied to cervix, though still very posterior. Pt declines forebag rupture as she is concerned that her labor will progress quickly and she would prefer not to deliver before her mother arrives at 1100. We also discussed that given her prolonged rupture time, we recommend labor augmentation with pitocin, however, pt continues to decline. We discussed increased risk of chorioamnionitis and infection with prolonged ROM and stalled labor given >6 hours without cervical change. Pt expressed understanding. She is amenable to a 2 hour recheck and voiced understanding that we would continue to recommend augmentation at next check.     Anabel Mckenzie MD   PGY-2, OB-GYN              "

## 2022-06-16 PROBLEM — D62 ACUTE BLOOD LOSS ANEMIA: Status: ACTIVE | Noted: 2022-06-16

## 2022-06-16 LAB
BACTERIA SPEC AEROBE CULT: NORMAL
BASOPHILS # BLD AUTO: 0.02 K/UL (ref 0–0.2)
BASOPHILS NFR BLD: 0.2 % (ref 0–1.9)
DIFFERENTIAL METHOD: ABNORMAL
EOSINOPHIL # BLD AUTO: 0.1 K/UL (ref 0–0.5)
EOSINOPHIL NFR BLD: 1 % (ref 0–8)
ERYTHROCYTE [DISTWIDTH] IN BLOOD BY AUTOMATED COUNT: 13.2 % (ref 11.5–14.5)
HCT VFR BLD AUTO: 24.4 % (ref 37–48.5)
HGB BLD-MCNC: 7.9 G/DL (ref 12–16)
IMM GRANULOCYTES # BLD AUTO: 0.17 K/UL (ref 0–0.04)
IMM GRANULOCYTES NFR BLD AUTO: 1.5 % (ref 0–0.5)
LYMPHOCYTES # BLD AUTO: 2.1 K/UL (ref 1–4.8)
LYMPHOCYTES NFR BLD: 18.5 % (ref 18–48)
MCH RBC QN AUTO: 27.3 PG (ref 27–31)
MCHC RBC AUTO-ENTMCNC: 32.4 G/DL (ref 32–36)
MCV RBC AUTO: 84 FL (ref 82–98)
MONOCYTES # BLD AUTO: 1.2 K/UL (ref 0.3–1)
MONOCYTES NFR BLD: 10.7 % (ref 4–15)
NEUTROPHILS # BLD AUTO: 7.8 K/UL (ref 1.8–7.7)
NEUTROPHILS NFR BLD: 68.1 % (ref 38–73)
NRBC BLD-RTO: 0 /100 WBC
PLATELET # BLD AUTO: 272 K/UL (ref 150–450)
PMV BLD AUTO: 10.4 FL (ref 9.2–12.9)
RBC # BLD AUTO: 2.89 M/UL (ref 4–5.4)
WBC # BLD AUTO: 11.43 K/UL (ref 3.9–12.7)

## 2022-06-16 PROCEDURE — 36415 COLL VENOUS BLD VENIPUNCTURE: CPT | Performed by: OBSTETRICS & GYNECOLOGY

## 2022-06-16 PROCEDURE — 85025 COMPLETE CBC W/AUTO DIFF WBC: CPT | Performed by: OBSTETRICS & GYNECOLOGY

## 2022-06-16 PROCEDURE — 25000003 PHARM REV CODE 250: Performed by: OBSTETRICS & GYNECOLOGY

## 2022-06-16 PROCEDURE — 25000003 PHARM REV CODE 250

## 2022-06-16 PROCEDURE — 11000001 HC ACUTE MED/SURG PRIVATE ROOM

## 2022-06-16 RX ADMIN — DOCUSATE SODIUM 200 MG: 100 CAPSULE, LIQUID FILLED ORAL at 08:06

## 2022-06-16 RX ADMIN — IBUPROFEN 600 MG: 600 TABLET ORAL at 04:06

## 2022-06-16 RX ADMIN — IBUPROFEN 600 MG: 600 TABLET ORAL at 12:06

## 2022-06-16 RX ADMIN — PRENATAL VIT W/ FE FUMARATE-FA TAB 27-0.8 MG 1 TABLET: 27-0.8 TAB at 08:06

## 2022-06-16 NOTE — LACTATION NOTE
LC provided Pt education on late pre-term behaviors. Lactation Basics education completed. LC reviewed Breastfeeding Guide and encouraged tracking feeds and output. Encouraged use of STS, frequent feeds on based baby's cues or at least every three hours, and avoiding artificial nipples. Pt verbalized understanding and questions answered. Pt aware to call LC for assistance with feeding.

## 2022-06-16 NOTE — DISCHARGE INSTRUCTIONS
Breastfeeding Discharge Instructions      Feed the baby at the earliest sign of hunger or comfort  Hands to mouth, sucking motions  Rooting or searching for something to suck on  Dont wait for crying - it is a sign of distress    The feedings may be 8-12 times per 24hrs and will not follow a schedule  Avoid pacifiers and bottles for the first 4 weeks  Alternate the breast you start the feeding with, or start with the breast that feels the fullest  Switch breasts when the baby takes himself off the breast or falls asleep  Keep offering breasts until the baby looks full, no longer gives hunger signs, and stays asleep when placed on his back in the crib  If the baby is sleepy and wont wake for a feeding, put the baby skin-to-skin dressed in a diaper against the mothers bare chest  Sleep near your baby  The baby should be positioned and latched on to the breast correctly  Chest-to-chest, chin in the breast  Babys lips are flipped outward  Babys mouth is stretched open wide like a shout  Babys sucking should feel like tugging to the mother  The baby should be drinking at the breast:  You should hear swallowing or gulping throughout the feeding  You should see milk on the babys lips when he comes off the breast  Your breasts should be softer when the baby is finished feeding  The baby should look relaxed at the end of feedings  After the 4th day and your milk is in:  The babys poop should turn bright yellow and be loose, watery, and seedy  The baby should have at least 3-4 poops the size of the palm of your hand per day  The baby should have at least 5-6 wet diapers per day  The urine should be light yellow in color  You should drink when you are thirsty and eat a healthy diet when you are    hungry.     Take naps to get the rest you need.   Take medications and/or drink alcohol only with permission of your obstetrician    or the babys pediatrician.  You can also call the [facility] Risk [facility],   ([phone]),  Monday-Friday, 8am-5pm Central time, to get the most   up-to-date evidence-based information on the use of medications during   pregnancy and breastfeeding.      The baby should be examined by a pediatrician at 3-5 days of age.  Once your   milk comes in, the baby should be gaining at least ½ - 1oz each day and should be back to birthweight no later than 10-14 days of age.          Community Resources    [facility] Breastfeeding Warmline: [phone]   Local [facility] clinics: provide incentives and breastpumps to eligible mothers  La Leche [employer] (LLLI):  mother-to-mother support group website        www.lll.ERN  Local La Leche League mother-to-mother support groups:        www.Axiom        La Leche League [facility]   Dr. Joshua Urbina website for latch videos and general information:        www.breastfeedinginc.ca  [facility] is a call center that provides information about the safety of taking medications while breastfeeding.  Call 1-545.618.1897, M-F, 8am-5pm, [place].  International Lactation Consultant Association provides resources for assistance:        www.ilca.org  [place] Breastfeeding [employer] provides informationand resources for parents  and the community    www.LaBreastfeedingSupport.org     Shaye Feldman is a mom-to-mom support group:                             www.nodaliDapu.com.com//breastfeedng-support/  [employer] Healthy Babies:  7-922-777-BABY(7302)  [employer]: a breastfeeding support group for women of color, 244.695.5494

## 2022-06-16 NOTE — LACTATION NOTE
Lactation note:    LC to room. Mother states baby is starting to feed more frequently, discussed feeding on cue 8 or more times in 24hrs, baby may wake up more closer to 24hrs of life and start cluster feeding. Lactation basic education reviewed, encouraged to call for latch assessment. LC number on board. Visitors entered room.

## 2022-06-16 NOTE — PLAN OF CARE
VSS. Pt ambulating and voiding independently. Bonding well with infant.  Significant other at the bedside and assisting with pt's needs. Pain well managed with scheduled medications. Fundus is midline and firm. No further changes at this time.

## 2022-06-16 NOTE — PLAN OF CARE
Plan of care reviewed with pt, verbalizes understanding. VSS. Ambulating and voiding without difficulty. Fundus firm wtihout massage, light bleeding noted. Pain well controlled with scheduled pain meds. No further concerns noted, will continue to monitor.

## 2022-06-17 ENCOUNTER — PATIENT MESSAGE (OUTPATIENT)
Dept: ADMINISTRATIVE | Facility: HOSPITAL | Age: 32
End: 2022-06-17
Payer: MEDICAID

## 2022-06-17 VITALS
RESPIRATION RATE: 18 BRPM | SYSTOLIC BLOOD PRESSURE: 128 MMHG | DIASTOLIC BLOOD PRESSURE: 78 MMHG | HEART RATE: 98 BPM | OXYGEN SATURATION: 97 % | TEMPERATURE: 98 F | BODY MASS INDEX: 27.51 KG/M2 | WEIGHT: 175.25 LBS | HEIGHT: 67 IN

## 2022-06-17 PROCEDURE — 25000003 PHARM REV CODE 250: Performed by: OBSTETRICS & GYNECOLOGY

## 2022-06-17 PROCEDURE — 25000003 PHARM REV CODE 250

## 2022-06-17 RX ORDER — IBUPROFEN 600 MG/1
600 TABLET ORAL EVERY 6 HOURS PRN
Qty: 60 TABLET | Refills: 1 | Status: SHIPPED | OUTPATIENT
Start: 2022-06-17

## 2022-06-17 RX ORDER — FERROUS SULFATE 325(65) MG
325 TABLET, DELAYED RELEASE (ENTERIC COATED) ORAL DAILY
Qty: 60 TABLET | Refills: 3 | Status: SHIPPED | OUTPATIENT
Start: 2022-06-17

## 2022-06-17 RX ADMIN — PRENATAL VIT W/ FE FUMARATE-FA TAB 27-0.8 MG 1 TABLET: 27-0.8 TAB at 08:06

## 2022-06-17 RX ADMIN — IBUPROFEN 600 MG: 600 TABLET ORAL at 08:06

## 2022-06-17 RX ADMIN — DOCUSATE SODIUM 200 MG: 100 CAPSULE, LIQUID FILLED ORAL at 08:06

## 2022-06-17 NOTE — DISCHARGE SUMMARY
Delivery Discharge Summary  Obstetrics      Primary OB Clinician: Johanna Montana DO      Admission date: 2022  Discharge date: 2022    Disposition: To home, self care    Discharge Diagnosis List:      Patient Active Problem List   Diagnosis    Myalgia of pelvic floor    Muscle spasm    Pregnancy with one fetus, antepartum    History of syncope with blood draws     premature rupture of membranes (PPROM) with onset of labor after 24 hours of rupture in third trimester, antepartum    PROM (premature rupture of membranes)     (spontaneous vaginal delivery)    Acute blood loss anemia       Procedure:     Hospital Course:  Louann Kapoor is a 32 y.o. now , PPD #2 who was admitted on 2022 at 37w0d for PPROM. Patient was subsequently admitted to labor and delivery unit with signed consents.     Labor course was uncomplicated and resulted in  without complications.     Please see delivery note for further details. Her postpartum course was uncomplicated. On discharge day, patient's pain is controlled with oral pain medications. Pt is tolerating ambulation without SOB or CP, and regular diet without N/V. Reports lochia is mild. Denies any HA, vision changes, F/C, LE swelling. Denies any breast pain/soreness.    Pt in stable condition and ready for discharge. She has been instructed to start and/or continue medications and follow up with her obstetrics provider as listed below.    Pertinent studies:  CBC  Recent Labs   Lab 22  1554 22  0627   WBC 10.70 11.43   HGB 10.3* 7.9*   HCT 31.9* 24.4*   MCV 84 84    272          There is no immunization history for the selected administration types on file for this patient.     Delivery:    Episiotomy: None   Lacerations: None   Repair suture: None   Repair # of packets: 0   Blood loss (ml):       Birth information:  YOB: 2024   Time of birth: 1:17 PM   Sex: male   Delivery type: Vaginal,  Spontaneous   Gestational Age: 37w0d    Delivery Clinician:      Other providers:       Additional  information:  Forceps:    Vacuum:    Breech:    Observed anomalies      Living?:           APGARS  One minute Five minutes Ten minutes   Skin color:         Heart rate:         Grimace:         Muscle tone:         Breathing:         Totals: 8  9        Placenta: Delivered:       appearance      Patient Instructions:   Current Discharge Medication List      START taking these medications    Details   ferrous sulfate 325 (65 FE) MG EC tablet Take 1 tablet (325 mg total) by mouth once daily.  Qty: 60 tablet, Refills: 3      ibuprofen (ADVIL,MOTRIN) 600 MG tablet Take 1 tablet (600 mg total) by mouth every 6 (six) hours as needed for Pain.  Qty: 60 tablet, Refills: 1         CONTINUE these medications which have NOT CHANGED    Details   famotidine-calcium carbonate-magnesium hydroxide (PEPCID COMPLETE) chewable tablet Take 1 tablet by mouth daily as needed.      pantoprazole (PROTONIX) 40 MG tablet Take 1 tablet (40 mg total) by mouth once daily.  Qty: 30 tablet, Refills: 1      PRENATAL 25/IRON/FOLATE 6/DHA (PRENA1 ORAL) Take by mouth.             Discharge Procedure Orders   Pelvic Rest   Order Comments: Pelvic rest until follow up visit. Nothing in vagina -no sex, tampons, douching, etc.     Notify your health care provider if you experience any of the following:   Order Comments: Heavy vaginal bleeding saturating more than 1 pad per hr for at least consecutive 2 hrs.     Notify your health care provider if you experience any of the following:  increased confusion or weakness     Notify your health care provider if you experience any of the following:  persistent dizziness, light-headedness, or visual disturbances     Notify your health care provider if you experience any of the following:  difficulty breathing or increased cough     Notify your health care provider if you experience any of the following:  severe  uncontrolled pain     Notify your health care provider if you experience any of the following:  persistent nausea and vomiting or diarrhea     Notify your health care provider if you experience any of the following:  temperature >100.4     Notify your health care provider if you experience any of the following:  severe persistent headache        Follow-up Information     Johanna Montana, DO Follow up in 6 week(s).    Specialty: Obstetrics and Gynecology  Why: Postpartum Visit  Contact information:  [address] [place] [id number]  [phone]                          Blanca Hernández MD  OBGYN, PGY-1

## 2022-06-17 NOTE — NURSING
Discharge instructions given to pt, verbalizes understanding. VSS. Voiding and ambulating without difficulty. Fundus firm without massage. Pain well controlled with scheduled pain meds. All concerns addressed.

## 2022-06-17 NOTE — PROGRESS NOTES
POSTPARTUM PROGRESS NOTE    Subjective:     PPD/POD#: 2   Procedure:    EGA: 37w0d   N/V: No   F/C: No   Abd Pain: Mild, well-controlled with oral pain medication   Lochia: Mild   Voiding: Yes   Ambulating: Yes   Bowel fnc: Yes   Breastfeeding: Yes   Contraception: Per primary OB   Circumcision: Consented.  Needs to be examined by OB attending.     Objective:      Temp:  [97.7 °F (36.5 °C)-98.4 °F (36.9 °C)] 98 °F (36.7 °C)  Pulse:  [] 98  Resp:  [18] 18  SpO2:  [95 %-98 %] 97 %  BP: (125-133)/(76-78) 128/78    Lung: Normal respiratory effort   Abdomen: Soft, appropriately tender   Uterus: Firm, no fundal tenderness   Incision: N/A   : Deferred   Extremities: Bilateral trace edema     Lab Review    Recent Labs   Lab 22  2035      K 4.5      CO2 16*   BUN 7   CREATININE 0.7   GLU 71   PROT 7.3   BILITOT 0.3   ALKPHOS 134   ALT 10   AST 27       Recent Labs   Lab 22  1554 22  0627   WBC 10.70 11.43   HGB 10.3* 7.9*   HCT 31.9* 24.4*   MCV 84 84    272         I/O  No intake or output data in the 24 hours ending 22 0839     Assessment and Plan:   Postpartum care:  - Patient doing well.  - Continue routine management and advances.    Anemia   - H/H as above  - QBL: 105 mL  - asymptomatic  - iron/colace      Blanca Hernández MD  OBGYN, PGY-1

## 2022-06-17 NOTE — PLAN OF CARE
Patient ambulating freely in room.  Lochia rubra &  Light.  Fundus firm and 1 below the U.  Tolerating regular diet.  Voiding, passing flatus stooling. Questions answered/encouraged; reassurance provided.  Pt states pain goal met with prescribed medications per MD.  Family support noted at bedside. POC reviewed with pt; able to verbalize acceptance and understanding. VS stable. Call light in reach, side rails up x2, nonskid socks on, bed in lowest position with wheels locked.  Remains free from falls and/or injury. NAD noted.  Will continue to monitor.

## 2022-06-17 NOTE — LACTATION NOTE
06/17/22 1141   Maternal Assessment   Breast Shape Bilateral:;round   Breast Density Bilateral:;soft   Areola Bilateral:;elastic   Nipples Bilateral:;everted   Maternal Infant Feeding   Maternal Emotional State assist needed   Infant Positioning cross-cradle   Signs of Milk Transfer audible swallow;infant jaw motion present   Pain with Feeding no   Comfort Measures Before/During Feeding infant position adjusted   Latch Assistance yes   Pt receptive to receiving assistance with breastfeeding. LC encouraged feeding baby skin to skin to stimulate baby to quiet alert state. Baby uncoordinated at the breast. Pt describes feeling occasional gnawing and inconsistent.  Improves with breast compression;however, chin quiver noted. LC encouraged Pt to feed on cue or at least every three hours. Lactation discharge education completed. Plan of care is for pt to follow basic breastfeeding education, frequent feeding on cue or at least every three hours, and to monitor baby's voids and stools. Breastfeeding guide, including First Alert survey, resource list, and lactation warmline phone number reviewed. Based on feeding goals, Pt to nurse,pump, and supplement until baby is content. Pt educated on paced bottle feeding. Pt to notify doctor for maternal or infant concerns, as reviewed with LC. Pt verbalizes understanding and questions answered.

## 2022-06-22 ENCOUNTER — PATIENT MESSAGE (OUTPATIENT)
Dept: LACTATION | Facility: CLINIC | Age: 32
End: 2022-06-22
Payer: MEDICAID

## 2022-06-22 LAB
FINAL PATHOLOGIC DIAGNOSIS: NORMAL
Lab: NORMAL

## 2022-06-27 ENCOUNTER — TELEPHONE (OUTPATIENT)
Dept: OBSTETRICS AND GYNECOLOGY | Facility: CLINIC | Age: 32
End: 2022-06-27
Payer: MEDICAID

## 2022-06-27 NOTE — TELEPHONE ENCOUNTER
----- Message from Stephanie Dillon sent at 6/27/2022  8:27 AM CDT -----  Regarding: Call BAck  Name of Who is Calling:DYLLAN CRESPO [[id number]]              What is the request in detail: Patient requesting a call back states she's 12 days post partum.and needs someone to talk to  Please assist              Can the clinic reply by [facility]: No              What Number to Call Back if not in [facility]:[phone]

## 2022-06-27 NOTE — TELEPHONE ENCOUNTER
Patient had an issue with placenta coming out . Came out at 20 minute man. They had to pushed placenta out and shifted some things and had to retrain bladder.Post discharge to home, 1st week  swelling, bleeding. Her stomach hasn't gone down significantly and she doesn't feel any uterine contractions when she's pumping.She has been suffering with headaches, on Saturday, she felt very hot like she had a fever and her skin turned pale like a ghost. This went a way quickly and chills and swelling came on, she felt lightheaded dizziness. Took Tylenol and it help with some relief but still felt an underlining dizziness. She feels like something isn't right . She was unable to check temperature and blood pressure because of her family just moving and everything are in boxes.      ----- Message from Jose Reza sent at 6/27/2022  9:32 AM CDT -----  Please call pt regarding her post part  appt [phone]

## 2022-06-28 ENCOUNTER — TELEPHONE (OUTPATIENT)
Dept: OBSTETRICS AND GYNECOLOGY | Facility: CLINIC | Age: 32
End: 2022-06-28
Payer: MEDICAID

## 2022-07-06 ENCOUNTER — PATIENT MESSAGE (OUTPATIENT)
Dept: OBSTETRICS AND GYNECOLOGY | Facility: CLINIC | Age: 32
End: 2022-07-06
Payer: MEDICAID

## 2022-08-04 NOTE — PROGRESS NOTES
Patient seen and examined.  No complaints, denies VB/LOF/Ctxns, reports good fetal movement. Precautions for Bleeding/ ROM/ Decreased fetal movement/ Pre-E reviewed.  Declines tdap.  F/U scheduled 1 weeks

## 2022-08-05 ENCOUNTER — POSTPARTUM VISIT (OUTPATIENT)
Dept: OBSTETRICS AND GYNECOLOGY | Facility: CLINIC | Age: 32
End: 2022-08-05
Attending: OBSTETRICS & GYNECOLOGY
Payer: MEDICAID

## 2022-08-05 VITALS
HEART RATE: 75 BPM | BODY MASS INDEX: 24.22 KG/M2 | SYSTOLIC BLOOD PRESSURE: 147 MMHG | HEIGHT: 67 IN | WEIGHT: 154.31 LBS | DIASTOLIC BLOOD PRESSURE: 97 MMHG

## 2022-08-05 DIAGNOSIS — M62.08 RECTUS DIASTASIS: ICD-10-CM

## 2022-08-05 PROCEDURE — 0503F POSTPARTUM CARE VISIT: CPT | Mod: CPTII,,, | Performed by: OBSTETRICS & GYNECOLOGY

## 2022-08-05 PROCEDURE — 99213 OFFICE O/P EST LOW 20 MIN: CPT | Mod: PBBFAC,TH | Performed by: OBSTETRICS & GYNECOLOGY

## 2022-08-05 PROCEDURE — 99999 PR PBB SHADOW E&M-EST. PATIENT-LVL III: ICD-10-PCS | Mod: PBBFAC,,, | Performed by: OBSTETRICS & GYNECOLOGY

## 2022-08-05 PROCEDURE — 99999 PR PBB SHADOW E&M-EST. PATIENT-LVL III: CPT | Mod: PBBFAC,,, | Performed by: OBSTETRICS & GYNECOLOGY

## 2022-08-05 PROCEDURE — 0503F PR POSTPARTUM CARE VISIT: ICD-10-PCS | Mod: CPTII,,, | Performed by: OBSTETRICS & GYNECOLOGY

## 2022-08-05 NOTE — PROGRESS NOTES
"     Louann Kapoor is a 32 y.o.  who presents for a postpartum visit.  She is status post  vaginal delivery complicated by PPROM  6 weeks ago.  Her hospitalization was not complicated.  She is breastfeeding.  She denies abdominal pain, AUB and signs and symptoms of postpartum depression.  Episode of BP drop after delivery, now feeling better, normal PO intake, no fever, no heavy bleeding.    Her last pap was norm on 2021     History reviewed. No pertinent past medical history.    Objective:     BP (!) 147/97   Pulse 75   Ht 5' 7" (1.702 m)   Wt 70 kg (154 lb 5.2 oz)   LMP 10/01/2021   Breastfeeding Yes   BMI 24.17 kg/m²     General: healthy, alert, no distress  Abdomen: no masses, hepatosplenomegaly, no hernias  External Genitalia: normal, well-healed, without lesions or masses  Vagina: normal, well-healed, physiologic discharge, without lesions  Cervix: normal, well-healed, without lesions  Uterus: normal size, well involuted, firm, non-tender  Adnexa: no masses palpable and nontender  Psych: BEHAVIOR: cooperative, MOOD: appropriate, THOUGHT CONTENT: appropriate  Assessment:     Routine postpartum follow-up    Rectus diastasis  -     Ambulatory referral/consult to Physical/Occupational Therapy; Future; Expected date: 2022     (spontaneous vaginal delivery)        Plan:     1. Return to clinic in 3-6 months for annual exam  "

## 2024-07-26 ENCOUNTER — PATIENT MESSAGE (OUTPATIENT)
Dept: RESEARCH | Facility: HOSPITAL | Age: 34
End: 2024-07-26
Payer: MEDICAID

## 2024-08-06 ENCOUNTER — TELEPHONE (OUTPATIENT)
Dept: RESEARCH | Facility: HOSPITAL | Age: 34
End: 2024-08-06
Payer: MEDICAID